# Patient Record
Sex: MALE | Race: ASIAN | Employment: OTHER | URBAN - METROPOLITAN AREA
[De-identification: names, ages, dates, MRNs, and addresses within clinical notes are randomized per-mention and may not be internally consistent; named-entity substitution may affect disease eponyms.]

---

## 2017-03-08 PROBLEM — H43.822 VITREOMACULAR ADHESION OF LEFT EYE: Status: ACTIVE | Noted: 2017-03-08

## 2017-04-03 PROBLEM — Z85.46 HISTORY OF PROSTATE CANCER: Status: ACTIVE | Noted: 2017-04-03

## 2017-04-03 PROBLEM — H35.81 CLINICALLY SIGNIFICANT MACULAR EDEMA: Status: ACTIVE | Noted: 2017-04-03

## 2017-04-03 PROBLEM — Z85.79 HISTORY OF LYMPHOMA: Status: ACTIVE | Noted: 2017-04-03

## 2017-05-11 ENCOUNTER — LAB ENCOUNTER (OUTPATIENT)
Dept: LAB | Age: 72
End: 2017-05-11
Attending: INTERNAL MEDICINE
Payer: MEDICARE

## 2017-05-11 DIAGNOSIS — E66.01 MORBID OBESITY (HCC): ICD-10-CM

## 2017-05-11 DIAGNOSIS — N18.30 CHRONIC KIDNEY DISEASE, STAGE III (MODERATE) (HCC): ICD-10-CM

## 2017-05-11 DIAGNOSIS — E55.9 AVITAMINOSIS D: ICD-10-CM

## 2017-05-11 DIAGNOSIS — E11.8 DIABETIC COMPLICATION (HCC): Primary | ICD-10-CM

## 2017-05-11 PROCEDURE — 80076 HEPATIC FUNCTION PANEL: CPT

## 2017-05-11 PROCEDURE — 82043 UR ALBUMIN QUANTITATIVE: CPT

## 2017-05-11 PROCEDURE — 83735 ASSAY OF MAGNESIUM: CPT

## 2017-05-11 PROCEDURE — 85025 COMPLETE CBC W/AUTO DIFF WBC: CPT

## 2017-05-11 PROCEDURE — 84100 ASSAY OF PHOSPHORUS: CPT

## 2017-05-11 PROCEDURE — 82570 ASSAY OF URINE CREATININE: CPT

## 2017-05-11 PROCEDURE — 36415 COLL VENOUS BLD VENIPUNCTURE: CPT

## 2017-05-11 PROCEDURE — 83970 ASSAY OF PARATHORMONE: CPT

## 2017-07-31 ENCOUNTER — LAB ENCOUNTER (OUTPATIENT)
Dept: LAB | Age: 72
End: 2017-07-31
Attending: INTERNAL MEDICINE
Payer: MEDICARE

## 2017-07-31 DIAGNOSIS — C88.0 MACROGLOBULINEMIA (HCC): Primary | ICD-10-CM

## 2017-07-31 PROCEDURE — 82784 ASSAY IGA/IGD/IGG/IGM EACH: CPT

## 2017-08-01 LAB — IMMUNOGLOBULIN A: 184 MG/DL (ref 70–312)

## 2017-09-28 PROBLEM — G57.93 NEUROPATHY OF BOTH FEET: Status: ACTIVE | Noted: 2017-09-28

## 2018-03-14 ENCOUNTER — LAB ENCOUNTER (OUTPATIENT)
Dept: LAB | Age: 73
End: 2018-03-14
Attending: INTERNAL MEDICINE
Payer: MEDICARE

## 2018-03-14 DIAGNOSIS — E11.8 DIABETIC COMPLICATION (HCC): ICD-10-CM

## 2018-03-14 DIAGNOSIS — N18.30 CHRONIC KIDNEY DISEASE, STAGE III (MODERATE) (HCC): ICD-10-CM

## 2018-03-14 DIAGNOSIS — E66.01 MORBID OBESITY (HCC): Primary | ICD-10-CM

## 2018-03-14 LAB
25-HYDROXYVITAMIN D (TOTAL): 39.3 NG/ML (ref 30–100)
ALBUMIN SERPL-MCNC: 3.8 G/DL (ref 3.5–4.8)
ALP LIVER SERPL-CCNC: 110 U/L (ref 45–117)
ALT SERPL-CCNC: 31 U/L (ref 17–63)
AST SERPL-CCNC: 19 U/L (ref 15–41)
BASOPHILS # BLD AUTO: 0.03 X10(3) UL (ref 0–0.1)
BASOPHILS NFR BLD AUTO: 0.3 %
BILIRUB DIRECT SERPL-MCNC: <0.1 MG/DL (ref 0.1–0.5)
BILIRUB SERPL-MCNC: 0.4 MG/DL (ref 0.1–2)
BILIRUB UR QL STRIP.AUTO: NEGATIVE
BUN BLD-MCNC: 43 MG/DL (ref 8–20)
CALCIUM BLD-MCNC: 9.4 MG/DL (ref 8.3–10.3)
CHLORIDE: 104 MMOL/L (ref 101–111)
CHOLEST SMN-MCNC: 132 MG/DL (ref ?–200)
CLARITY UR REFRACT.AUTO: CLEAR
CO2: 26 MMOL/L (ref 22–32)
COLOR UR AUTO: YELLOW
CREAT BLD-MCNC: 2.16 MG/DL (ref 0.7–1.3)
CREAT UR-SCNC: 69.6 MG/DL
CREAT UR-SCNC: 69.6 MG/DL
EOSINOPHIL # BLD AUTO: 0.31 X10(3) UL (ref 0–0.3)
EOSINOPHIL NFR BLD AUTO: 3.3 %
ERYTHROCYTE [DISTWIDTH] IN BLOOD BY AUTOMATED COUNT: 13.1 % (ref 11.5–16)
GLUCOSE BLD-MCNC: 217 MG/DL (ref 70–99)
GLUCOSE UR STRIP.AUTO-MCNC: NEGATIVE MG/DL
HAV IGM SER QL: 2.1 MG/DL (ref 1.7–3)
HCT VFR BLD AUTO: 38.3 % (ref 37–53)
HDLC SERPL-MCNC: 33 MG/DL (ref 45–?)
HDLC SERPL: 4 {RATIO} (ref ?–4.97)
HGB BLD-MCNC: 13 G/DL (ref 13–17)
HYALINE CASTS #/AREA URNS AUTO: PRESENT /LPF
IMMATURE GRANULOCYTE COUNT: 0.02 X10(3) UL (ref 0–1)
IMMATURE GRANULOCYTE RATIO %: 0.2 %
KETONES UR STRIP.AUTO-MCNC: NEGATIVE MG/DL
LDLC SERPL CALC-MCNC: 75 MG/DL (ref ?–130)
LEUKOCYTE ESTERASE UR QL STRIP.AUTO: NEGATIVE
LYMPHOCYTES # BLD AUTO: 3.25 X10(3) UL (ref 0.9–4)
LYMPHOCYTES NFR BLD AUTO: 34.4 %
M PROTEIN MFR SERPL ELPH: 8.7 G/DL (ref 6.1–8.3)
MCH RBC QN AUTO: 29.3 PG (ref 27–33.2)
MCHC RBC AUTO-ENTMCNC: 33.9 G/DL (ref 31–37)
MCV RBC AUTO: 86.3 FL (ref 80–99)
MICROALBUMIN UR-MCNC: 39.7 MG/DL
MICROALBUMIN/CREAT 24H UR-RTO: 570.4 UG/MG (ref ?–30)
MONOCYTES # BLD AUTO: 0.68 X10(3) UL (ref 0.1–1)
MONOCYTES NFR BLD AUTO: 7.2 %
NEUTROPHIL ABS PRELIM: 5.15 X10 (3) UL (ref 1.3–6.7)
NEUTROPHILS # BLD AUTO: 5.15 X10(3) UL (ref 1.3–6.7)
NEUTROPHILS NFR BLD AUTO: 54.6 %
NITRITE UR QL STRIP.AUTO: NEGATIVE
NONHDLC SERPL-MCNC: 99 MG/DL (ref ?–130)
PH UR STRIP.AUTO: 5 [PH] (ref 4.5–8)
PHOSPHATE SERPL-MCNC: 4.4 MG/DL (ref 2.5–4.9)
PLATELET # BLD AUTO: 463 10(3)UL (ref 150–450)
POTASSIUM SERPL-SCNC: 4.7 MMOL/L (ref 3.6–5.1)
PROT UR STRIP.AUTO-MCNC: 30 MG/DL
PROT UR-MCNC: 59.9 MG/DL
PTH-INTACT SERPL-MCNC: 81 PG/ML (ref 11.1–79.5)
RBC # BLD AUTO: 4.44 X10(6)UL (ref 3.8–5.8)
RBC UR QL AUTO: NEGATIVE
RED CELL DISTRIBUTION WIDTH-SD: 39.9 FL (ref 35.1–46.3)
SODIUM SERPL-SCNC: 137 MMOL/L (ref 136–144)
SP GR UR STRIP.AUTO: 1.01 (ref 1–1.03)
TRIGL SERPL-MCNC: 122 MG/DL (ref ?–150)
URIC ACID: 8.3 MG/DL (ref 2.4–8.7)
UROBILINOGEN UR STRIP.AUTO-MCNC: <2 MG/DL
VLDLC SERPL CALC-MCNC: 24 MG/DL (ref 5–40)
WBC # BLD AUTO: 9.4 X10(3) UL (ref 4–13)

## 2018-03-14 PROCEDURE — 84156 ASSAY OF PROTEIN URINE: CPT

## 2018-03-14 PROCEDURE — 84100 ASSAY OF PHOSPHORUS: CPT

## 2018-03-14 PROCEDURE — 82570 ASSAY OF URINE CREATININE: CPT

## 2018-03-14 PROCEDURE — 80061 LIPID PANEL: CPT

## 2018-03-14 PROCEDURE — 81001 URINALYSIS AUTO W/SCOPE: CPT

## 2018-03-14 PROCEDURE — 36415 COLL VENOUS BLD VENIPUNCTURE: CPT

## 2018-03-14 PROCEDURE — 84550 ASSAY OF BLOOD/URIC ACID: CPT

## 2018-03-14 PROCEDURE — 85025 COMPLETE CBC W/AUTO DIFF WBC: CPT

## 2018-03-14 PROCEDURE — 82306 VITAMIN D 25 HYDROXY: CPT

## 2018-03-14 PROCEDURE — 83970 ASSAY OF PARATHORMONE: CPT

## 2018-03-14 PROCEDURE — 82043 UR ALBUMIN QUANTITATIVE: CPT

## 2018-03-14 PROCEDURE — 80048 BASIC METABOLIC PNL TOTAL CA: CPT

## 2018-03-14 PROCEDURE — 83735 ASSAY OF MAGNESIUM: CPT

## 2018-03-14 PROCEDURE — 80076 HEPATIC FUNCTION PANEL: CPT

## 2018-08-20 PROBLEM — E11.22 CKD STAGE 4 DUE TO TYPE 2 DIABETES MELLITUS (HCC): Status: ACTIVE | Noted: 2018-08-20

## 2018-08-20 PROBLEM — N18.4 CKD STAGE 4 DUE TO TYPE 2 DIABETES MELLITUS (HCC): Status: ACTIVE | Noted: 2018-08-20

## 2018-08-20 PROCEDURE — 81001 URINALYSIS AUTO W/SCOPE: CPT | Performed by: INTERNAL MEDICINE

## 2018-09-13 ENCOUNTER — LAB ENCOUNTER (OUTPATIENT)
Dept: LAB | Age: 73
End: 2018-09-13
Attending: INTERNAL MEDICINE
Payer: MEDICARE

## 2018-09-13 DIAGNOSIS — E11.8 DIABETIC COMPLICATION (HCC): Primary | ICD-10-CM

## 2018-09-13 DIAGNOSIS — E55.9 AVITAMINOSIS D: ICD-10-CM

## 2018-09-13 DIAGNOSIS — N18.30 CHRONIC KIDNEY DISEASE, STAGE III (MODERATE) (HCC): ICD-10-CM

## 2018-09-13 DIAGNOSIS — I10 ESSENTIAL HYPERTENSION, MALIGNANT: ICD-10-CM

## 2018-09-13 DIAGNOSIS — R73.09 ELEVATED HEMOGLOBIN A1C: ICD-10-CM

## 2018-09-13 LAB
ALBUMIN SERPL-MCNC: 3.5 G/DL (ref 3.5–4.8)
ALP LIVER SERPL-CCNC: 112 U/L (ref 45–117)
ALT SERPL-CCNC: 21 U/L (ref 17–63)
ANION GAP SERPL CALC-SCNC: 8 MMOL/L (ref 0–18)
AST SERPL-CCNC: 18 U/L (ref 15–41)
BASOPHILS # BLD AUTO: 0.03 X10(3) UL (ref 0–0.1)
BASOPHILS NFR BLD AUTO: 0.3 %
BILIRUB DIRECT SERPL-MCNC: <0.1 MG/DL (ref 0.1–0.5)
BILIRUB SERPL-MCNC: 0.3 MG/DL (ref 0.1–2)
BILIRUB UR QL STRIP.AUTO: NEGATIVE
BUN BLD-MCNC: 50 MG/DL (ref 8–20)
BUN/CREAT SERPL: 22.7 (ref 10–20)
CALCIUM BLD-MCNC: 8.6 MG/DL (ref 8.3–10.3)
CHLORIDE SERPL-SCNC: 99 MMOL/L (ref 101–111)
CHOLEST SMN-MCNC: 113 MG/DL (ref ?–200)
CLARITY UR REFRACT.AUTO: CLEAR
CO2 SERPL-SCNC: 25 MMOL/L (ref 22–32)
COLOR UR AUTO: YELLOW
CREAT BLD-MCNC: 2.2 MG/DL (ref 0.7–1.3)
CREAT UR-SCNC: 91 MG/DL
CREAT UR-SCNC: 92.4 MG/DL
EOSINOPHIL # BLD AUTO: 0.27 X10(3) UL (ref 0–0.3)
EOSINOPHIL NFR BLD AUTO: 2.7 %
ERYTHROCYTE [DISTWIDTH] IN BLOOD BY AUTOMATED COUNT: 13.2 % (ref 11.5–16)
EST. AVERAGE GLUCOSE BLD GHB EST-MCNC: 240 MG/DL (ref 68–126)
GLUCOSE BLD-MCNC: 300 MG/DL (ref 70–99)
GLUCOSE UR STRIP.AUTO-MCNC: NEGATIVE MG/DL
HAV IGM SER QL: 2 MG/DL (ref 1.8–2.5)
HBA1C MFR BLD HPLC: 10 % (ref ?–5.7)
HCT VFR BLD AUTO: 38 % (ref 37–53)
HDLC SERPL-MCNC: 25 MG/DL (ref 40–59)
HGB BLD-MCNC: 12.2 G/DL (ref 13–17)
HYALINE CASTS #/AREA URNS AUTO: PRESENT /LPF
IMMATURE GRANULOCYTE COUNT: 0.03 X10(3) UL (ref 0–1)
IMMATURE GRANULOCYTE RATIO %: 0.3 %
KETONES UR STRIP.AUTO-MCNC: NEGATIVE MG/DL
LDLC SERPL CALC-MCNC: 49 MG/DL (ref ?–100)
LEUKOCYTE ESTERASE UR QL STRIP.AUTO: NEGATIVE
LYMPHOCYTES # BLD AUTO: 4.17 X10(3) UL (ref 0.9–4)
LYMPHOCYTES NFR BLD AUTO: 41.2 %
M PROTEIN MFR SERPL ELPH: 8.6 G/DL (ref 6.1–8.3)
MCH RBC QN AUTO: 28.4 PG (ref 27–33.2)
MCHC RBC AUTO-ENTMCNC: 32.1 G/DL (ref 31–37)
MCV RBC AUTO: 88.6 FL (ref 80–99)
MICROALBUMIN UR-MCNC: 32.4 MG/DL
MICROALBUMIN/CREAT 24H UR-RTO: 356 UG/MG (ref ?–30)
MONOCYTES # BLD AUTO: 0.47 X10(3) UL (ref 0.1–1)
MONOCYTES NFR BLD AUTO: 4.6 %
NEUTROPHIL ABS PRELIM: 5.15 X10 (3) UL (ref 1.3–6.7)
NEUTROPHILS # BLD AUTO: 5.15 X10(3) UL (ref 1.3–6.7)
NEUTROPHILS NFR BLD AUTO: 50.9 %
NITRITE UR QL STRIP.AUTO: NEGATIVE
NONHDLC SERPL-MCNC: 88 MG/DL (ref ?–130)
OSMOLALITY SERPL CALC.SUM OF ELEC: 299 MOSM/KG (ref 275–295)
PH UR STRIP.AUTO: 5 [PH] (ref 4.5–8)
PHOSPHATE SERPL-MCNC: 3.6 MG/DL (ref 2.5–4.9)
PLATELET # BLD AUTO: 501 10(3)UL (ref 150–450)
POTASSIUM SERPL-SCNC: 4.5 MMOL/L (ref 3.6–5.1)
PROT UR STRIP.AUTO-MCNC: 30 MG/DL
PROT UR-MCNC: 48.1 MG/DL
RBC # BLD AUTO: 4.29 X10(6)UL (ref 3.8–5.8)
RBC UR QL AUTO: NEGATIVE
RED CELL DISTRIBUTION WIDTH-SD: 42.3 FL (ref 35.1–46.3)
SODIUM SERPL-SCNC: 132 MMOL/L (ref 136–144)
SP GR UR STRIP.AUTO: 1.01 (ref 1–1.03)
TRIGL SERPL-MCNC: 193 MG/DL (ref 30–149)
URATE SERPL-MCNC: 9 MG/DL (ref 2.4–8.7)
UROBILINOGEN UR STRIP.AUTO-MCNC: <2 MG/DL
VLDLC SERPL CALC-MCNC: 39 MG/DL (ref 0–30)
WBC # BLD AUTO: 10.1 X10(3) UL (ref 4–13)

## 2018-09-13 PROCEDURE — 83735 ASSAY OF MAGNESIUM: CPT

## 2018-09-13 PROCEDURE — 80076 HEPATIC FUNCTION PANEL: CPT

## 2018-09-13 PROCEDURE — 36415 COLL VENOUS BLD VENIPUNCTURE: CPT

## 2018-09-13 PROCEDURE — 84156 ASSAY OF PROTEIN URINE: CPT

## 2018-09-13 PROCEDURE — 82043 UR ALBUMIN QUANTITATIVE: CPT

## 2018-09-13 PROCEDURE — 85025 COMPLETE CBC W/AUTO DIFF WBC: CPT

## 2018-09-13 PROCEDURE — 83970 ASSAY OF PARATHORMONE: CPT

## 2018-09-13 PROCEDURE — 81001 URINALYSIS AUTO W/SCOPE: CPT

## 2018-09-13 PROCEDURE — 80048 BASIC METABOLIC PNL TOTAL CA: CPT

## 2018-09-13 PROCEDURE — 80061 LIPID PANEL: CPT

## 2018-09-13 PROCEDURE — 84100 ASSAY OF PHOSPHORUS: CPT

## 2018-09-13 PROCEDURE — 82570 ASSAY OF URINE CREATININE: CPT

## 2018-09-13 PROCEDURE — 83036 HEMOGLOBIN GLYCOSYLATED A1C: CPT

## 2018-09-13 PROCEDURE — 84550 ASSAY OF BLOOD/URIC ACID: CPT

## 2018-09-13 PROCEDURE — 82306 VITAMIN D 25 HYDROXY: CPT

## 2018-09-14 LAB
PTH-INTACT SERPL-MCNC: 137.2 PG/ML (ref 11.1–79.5)
VIT D+METAB SERPL-MCNC: 34.6 NG/ML (ref 30–100)

## 2018-09-18 NOTE — PROGRESS NOTES
S/w pt, advised of results and comments. Pt verbalized understanding and agreed. Labs in 3 mo ordered. Results with comments sent via [facility]. Routing also to MA pool to print and fax to Dr Lopez/nephrology  Fax # per pt is #[phone]  Thanks!

## 2018-09-19 PROBLEM — Z79.4 TYPE 2 DIABETES MELLITUS WITH BOTH EYES AFFECTED BY MODERATE NONPROLIFERATIVE RETINOPATHY AND MACULAR EDEMA, WITH LONG-TERM CURRENT USE OF INSULIN (HCC): Status: ACTIVE | Noted: 2018-09-19

## 2018-09-19 PROBLEM — E11.3313 TYPE 2 DIABETES MELLITUS WITH BOTH EYES AFFECTED BY MODERATE NONPROLIFERATIVE RETINOPATHY AND MACULAR EDEMA, WITH LONG-TERM CURRENT USE OF INSULIN (HCC): Status: ACTIVE | Noted: 2018-09-19

## 2018-09-26 PROBLEM — Z79.4 UNCONTROLLED TYPE 2 DIABETES MELLITUS WITH BOTH EYES AFFECTED BY MODERATE NONPROLIFERATIVE RETINOPATHY AND MACULAR EDEMA, WITH LONG-TERM CURRENT USE OF INSULIN (HCC): Status: ACTIVE | Noted: 2018-09-26

## 2018-09-26 PROBLEM — Z79.4 UNCONTROLLED TYPE 2 DIABETES MELLITUS WITH OTHER CIRCULATORY COMPLICATION, WITH LONG-TERM CURRENT USE OF INSULIN: Status: ACTIVE | Noted: 2018-09-26

## 2018-09-26 PROBLEM — E11.65 UNCONTROLLED TYPE 2 DIABETES MELLITUS WITH BOTH EYES AFFECTED BY MODERATE NONPROLIFERATIVE RETINOPATHY AND MACULAR EDEMA, WITH LONG-TERM CURRENT USE OF INSULIN (HCC): Status: ACTIVE | Noted: 2018-09-26

## 2018-09-26 PROBLEM — E11.29 MICROALBUMINURIA DUE TO TYPE 2 DIABETES MELLITUS (HCC): Status: ACTIVE | Noted: 2018-09-26

## 2018-09-26 PROBLEM — Z79.4 LONG-TERM INSULIN USE (HCC): Status: ACTIVE | Noted: 2018-09-26

## 2018-09-26 PROBLEM — IMO0002 UNCONTROLLED TYPE 2 DIABETES MELLITUS WITH STAGE 4 CHRONIC KIDNEY DISEASE, WITH LONG-TERM CURRENT USE OF INSULIN: Status: ACTIVE | Noted: 2018-09-26

## 2018-09-26 PROBLEM — Z79.4 UNCONTROLLED TYPE 2 DIABETES MELLITUS WITH STAGE 4 CHRONIC KIDNEY DISEASE, WITH LONG-TERM CURRENT USE OF INSULIN (HCC): Status: ACTIVE | Noted: 2018-09-26

## 2018-09-26 PROBLEM — E11.65 UNCONTROLLED TYPE 2 DIABETES MELLITUS WITH HYPERGLYCEMIA, WITH LONG-TERM CURRENT USE OF INSULIN (HCC): Status: ACTIVE | Noted: 2018-09-26

## 2018-09-26 PROBLEM — E11.22 UNCONTROLLED TYPE 2 DIABETES MELLITUS WITH STAGE 4 CHRONIC KIDNEY DISEASE, WITH LONG-TERM CURRENT USE OF INSULIN (HCC): Status: ACTIVE | Noted: 2018-09-26

## 2018-09-26 PROBLEM — IMO0002 UNCONTROLLED TYPE 2 DIABETES MELLITUS WITH BOTH EYES AFFECTED BY MODERATE NONPROLIFERATIVE RETINOPATHY AND MACULAR EDEMA, WITH LONG-TERM CURRENT USE OF INSULIN: Status: ACTIVE | Noted: 2018-09-26

## 2018-09-26 PROBLEM — N18.4 UNCONTROLLED TYPE 2 DIABETES MELLITUS WITH STAGE 4 CHRONIC KIDNEY DISEASE, WITH LONG-TERM CURRENT USE OF INSULIN (HCC): Status: ACTIVE | Noted: 2018-09-26

## 2018-09-26 PROBLEM — E11.65 UNCONTROLLED TYPE 2 DIABETES MELLITUS WITH STAGE 4 CHRONIC KIDNEY DISEASE, WITH LONG-TERM CURRENT USE OF INSULIN (HCC): Status: ACTIVE | Noted: 2018-09-26

## 2018-09-26 PROBLEM — E11.3313 TYPE 2 DIABETES MELLITUS WITH BOTH EYES AFFECTED BY MODERATE NONPROLIFERATIVE RETINOPATHY AND MACULAR EDEMA, WITH LONG-TERM CURRENT USE OF INSULIN (HCC): Status: RESOLVED | Noted: 2018-09-19 | Resolved: 2018-09-26

## 2018-09-26 PROBLEM — IMO0002 UNCONTROLLED TYPE 2 DIABETES MELLITUS WITH OTHER CIRCULATORY COMPLICATION, WITH LONG-TERM CURRENT USE OF INSULIN: Status: ACTIVE | Noted: 2018-09-26

## 2018-09-26 PROBLEM — Z79.4 TYPE 2 DIABETES MELLITUS WITH BOTH EYES AFFECTED BY MODERATE NONPROLIFERATIVE RETINOPATHY AND MACULAR EDEMA, WITH LONG-TERM CURRENT USE OF INSULIN (HCC): Status: RESOLVED | Noted: 2018-09-19 | Resolved: 2018-09-26

## 2018-09-26 PROBLEM — R80.9 MICROALBUMINURIA DUE TO TYPE 2 DIABETES MELLITUS (HCC): Status: ACTIVE | Noted: 2018-09-26

## 2018-09-26 PROBLEM — E11.59 UNCONTROLLED TYPE 2 DIABETES MELLITUS WITH OTHER CIRCULATORY COMPLICATION, WITH LONG-TERM CURRENT USE OF INSULIN: Status: ACTIVE | Noted: 2018-09-26

## 2018-09-26 PROBLEM — E11.65 TYPE 2 DIABETES, UNCONTROLLED, WITH NEUROPATHY (HCC): Status: ACTIVE | Noted: 2018-09-26

## 2018-09-26 PROBLEM — E11.65 UNCONTROLLED TYPE 2 DIABETES MELLITUS WITH OTHER CIRCULATORY COMPLICATION, WITH LONG-TERM CURRENT USE OF INSULIN: Status: ACTIVE | Noted: 2018-09-26

## 2018-09-26 PROBLEM — E11.3313 UNCONTROLLED TYPE 2 DIABETES MELLITUS WITH BOTH EYES AFFECTED BY MODERATE NONPROLIFERATIVE RETINOPATHY AND MACULAR EDEMA, WITH LONG-TERM CURRENT USE OF INSULIN (HCC): Status: ACTIVE | Noted: 2018-09-26

## 2018-09-26 PROBLEM — Z79.4 UNCONTROLLED TYPE 2 DIABETES MELLITUS WITH HYPERGLYCEMIA, WITH LONG-TERM CURRENT USE OF INSULIN (HCC): Status: ACTIVE | Noted: 2018-09-26

## 2018-09-26 PROBLEM — IMO0002 TYPE 2 DIABETES, UNCONTROLLED, WITH NEUROPATHY: Status: ACTIVE | Noted: 2018-09-26

## 2018-09-26 PROBLEM — E11.40 TYPE 2 DIABETES, UNCONTROLLED, WITH NEUROPATHY (HCC): Status: ACTIVE | Noted: 2018-09-26

## 2018-10-16 PROBLEM — I35.0 NONRHEUMATIC AORTIC VALVE STENOSIS: Status: ACTIVE | Noted: 2018-10-16

## 2018-11-28 ENCOUNTER — APPOINTMENT (OUTPATIENT)
Dept: LAB | Age: 73
End: 2018-11-28
Attending: INTERNAL MEDICINE
Payer: MEDICARE

## 2018-11-28 DIAGNOSIS — N18.30 CHRONIC KIDNEY DISEASE, STAGE III (MODERATE) (HCC): ICD-10-CM

## 2018-11-28 DIAGNOSIS — E55.9 AVITAMINOSIS D: ICD-10-CM

## 2018-11-28 DIAGNOSIS — R73.09 ELEVATED HEMOGLOBIN A1C: ICD-10-CM

## 2018-11-28 DIAGNOSIS — E11.8 DIABETIC COMPLICATION (HCC): ICD-10-CM

## 2018-11-28 DIAGNOSIS — I10 ESSENTIAL HYPERTENSION, MALIGNANT: ICD-10-CM

## 2018-11-28 PROCEDURE — 36415 COLL VENOUS BLD VENIPUNCTURE: CPT

## 2018-11-28 PROCEDURE — 82306 VITAMIN D 25 HYDROXY: CPT

## 2018-11-28 PROCEDURE — 83970 ASSAY OF PARATHORMONE: CPT

## 2019-01-09 PROCEDURE — 81001 URINALYSIS AUTO W/SCOPE: CPT | Performed by: INTERNAL MEDICINE

## 2019-01-15 ENCOUNTER — LAB ENCOUNTER (OUTPATIENT)
Dept: LAB | Age: 74
End: 2019-01-15
Attending: INTERNAL MEDICINE
Payer: MEDICARE

## 2019-01-15 DIAGNOSIS — N18.30 CHRONIC KIDNEY DISEASE, STAGE III (MODERATE) (HCC): Primary | ICD-10-CM

## 2019-01-15 LAB
ANION GAP SERPL CALC-SCNC: 11 MMOL/L (ref 0–18)
BUN BLD-MCNC: 58 MG/DL (ref 8–20)
BUN/CREAT SERPL: 27.2 (ref 10–20)
CALCIUM BLD-MCNC: 9.3 MG/DL (ref 8.3–10.3)
CHLORIDE SERPL-SCNC: 97 MMOL/L (ref 101–111)
CO2 SERPL-SCNC: 25 MMOL/L (ref 22–32)
CREAT BLD-MCNC: 2.13 MG/DL (ref 0.7–1.3)
GLUCOSE BLD-MCNC: 155 MG/DL (ref 70–99)
OSMOLALITY SERPL CALC.SUM OF ELEC: 295 MOSM/KG (ref 275–295)
POTASSIUM SERPL-SCNC: 3.9 MMOL/L (ref 3.6–5.1)
SODIUM SERPL-SCNC: 133 MMOL/L (ref 136–144)

## 2019-01-15 PROCEDURE — 36415 COLL VENOUS BLD VENIPUNCTURE: CPT

## 2019-01-15 PROCEDURE — 80048 BASIC METABOLIC PNL TOTAL CA: CPT

## 2019-02-15 ENCOUNTER — OFFICE VISIT (OUTPATIENT)
Dept: RHEUMATOLOGY | Facility: CLINIC | Age: 74
End: 2019-02-15
Payer: MEDICARE

## 2019-02-15 ENCOUNTER — APPOINTMENT (OUTPATIENT)
Dept: LAB | Age: 74
End: 2019-02-15
Attending: INTERNAL MEDICINE
Payer: MEDICARE

## 2019-02-15 VITALS
DIASTOLIC BLOOD PRESSURE: 62 MMHG | RESPIRATION RATE: 16 BRPM | WEIGHT: 165 LBS | SYSTOLIC BLOOD PRESSURE: 122 MMHG | HEART RATE: 68 BPM | BODY MASS INDEX: 24 KG/M2 | TEMPERATURE: 99 F

## 2019-02-15 DIAGNOSIS — E79.0 HYPERURICEMIA: ICD-10-CM

## 2019-02-15 DIAGNOSIS — M79.10 MYALGIA: ICD-10-CM

## 2019-02-15 DIAGNOSIS — Z85.72 HISTORY OF NON-HODGKIN'S LYMPHOMA: ICD-10-CM

## 2019-02-15 DIAGNOSIS — R74.8 ELEVATED CK: Primary | ICD-10-CM

## 2019-02-15 DIAGNOSIS — M79.674 PAIN OF TOE OF RIGHT FOOT: ICD-10-CM

## 2019-02-15 DIAGNOSIS — R70.0 ELEVATED SED RATE: ICD-10-CM

## 2019-02-15 DIAGNOSIS — M62.81 MUSCLE WEAKNESS: ICD-10-CM

## 2019-02-15 DIAGNOSIS — R74.8 ELEVATED CK: ICD-10-CM

## 2019-02-15 LAB
BILIRUB UR QL STRIP.AUTO: NEGATIVE
CK SERPL-CCNC: 73 U/L (ref 39–308)
CLARITY UR REFRACT.AUTO: CLEAR
COLOR UR AUTO: YELLOW
CRP SERPL-MCNC: 0.73 MG/DL (ref ?–0.3)
GLUCOSE UR STRIP.AUTO-MCNC: NEGATIVE MG/DL
IGA SERPL-MCNC: 195 MG/DL (ref 70–312)
IGM SERPL-MCNC: 2090 MG/DL (ref 43–279)
IMMUNOGLOBULIN PNL SER-MCNC: 930 MG/DL (ref 791–1643)
KETONES UR STRIP.AUTO-MCNC: NEGATIVE MG/DL
LDH SERPL L TO P-CCNC: 249 U/L (ref 87–241)
LEUKOCYTE ESTERASE UR QL STRIP.AUTO: NEGATIVE
NITRITE UR QL STRIP.AUTO: NEGATIVE
PH UR STRIP.AUTO: 5 [PH] (ref 4.5–8)
PROT UR STRIP.AUTO-MCNC: NEGATIVE MG/DL
RBC UR QL AUTO: NEGATIVE
RHEUMATOID FACT SERPL-ACNC: 293 IU/ML (ref ?–15)
SED RATE-ML: 116 MM/HR (ref 0–12)
SP GR UR STRIP.AUTO: 1.01 (ref 1–1.03)
URATE SERPL-MCNC: 12.5 MG/DL (ref 3.5–7.2)
UROBILINOGEN UR STRIP.AUTO-MCNC: <2 MG/DL

## 2019-02-15 PROCEDURE — 86255 FLUORESCENT ANTIBODY SCREEN: CPT

## 2019-02-15 PROCEDURE — 83516 IMMUNOASSAY NONANTIBODY: CPT

## 2019-02-15 PROCEDURE — 82550 ASSAY OF CK (CPK): CPT

## 2019-02-15 PROCEDURE — 84550 ASSAY OF BLOOD/URIC ACID: CPT

## 2019-02-15 PROCEDURE — 86235 NUCLEAR ANTIGEN ANTIBODY: CPT

## 2019-02-15 PROCEDURE — 83615 LACTATE (LD) (LDH) ENZYME: CPT

## 2019-02-15 PROCEDURE — 96372 THER/PROPH/DIAG INJ SC/IM: CPT | Performed by: INTERNAL MEDICINE

## 2019-02-15 PROCEDURE — 82085 ASSAY OF ALDOLASE: CPT

## 2019-02-15 PROCEDURE — 85652 RBC SED RATE AUTOMATED: CPT | Performed by: INTERNAL MEDICINE

## 2019-02-15 PROCEDURE — 83876 ASSAY MYELOPEROXIDASE: CPT

## 2019-02-15 PROCEDURE — 81003 URINALYSIS AUTO W/O SCOPE: CPT | Performed by: INTERNAL MEDICINE

## 2019-02-15 PROCEDURE — 82784 ASSAY IGA/IGD/IGG/IGM EACH: CPT

## 2019-02-15 PROCEDURE — 86140 C-REACTIVE PROTEIN: CPT | Performed by: INTERNAL MEDICINE

## 2019-02-15 PROCEDURE — 86200 CCP ANTIBODY: CPT

## 2019-02-15 PROCEDURE — 86431 RHEUMATOID FACTOR QUANT: CPT | Performed by: INTERNAL MEDICINE

## 2019-02-15 PROCEDURE — 99205 OFFICE O/P NEW HI 60 MIN: CPT | Performed by: INTERNAL MEDICINE

## 2019-02-15 PROCEDURE — 86038 ANTINUCLEAR ANTIBODIES: CPT

## 2019-02-15 RX ORDER — PREDNISONE 10 MG/1
50 TABLET ORAL DAILY
Qty: 90 TABLET | Refills: 2 | Status: SHIPPED | OUTPATIENT
Start: 2019-02-15 | End: 2019-02-25

## 2019-02-15 RX ORDER — METHYLPREDNISOLONE ACETATE 40 MG/ML
40 INJECTION, SUSPENSION INTRA-ARTICULAR; INTRALESIONAL; INTRAMUSCULAR; SOFT TISSUE ONCE
Status: COMPLETED | OUTPATIENT
Start: 2019-02-15 | End: 2019-02-15

## 2019-02-15 RX ADMIN — METHYLPREDNISOLONE ACETATE 40 MG: 40 INJECTION, SUSPENSION INTRA-ARTICULAR; INTRALESIONAL; INTRAMUSCULAR; SOFT TISSUE at 13:00:00

## 2019-02-17 LAB
ALDOLASE, SERUM: 5.8 U/L
CYCLIC CITRULLINATED PEPTIDE: 3 UNITS

## 2019-02-18 LAB — ANA SCREEN: NEGATIVE

## 2019-02-19 LAB
MYELOPEROX ANTIBODIES, IGG: 0 AU/ML
SERINE PROTEASE3, IGG: 1 AU/ML

## 2019-02-21 DIAGNOSIS — R70.0 ELEVATED SED RATE: Primary | ICD-10-CM

## 2019-02-21 DIAGNOSIS — Z85.79 HISTORY OF LYMPHOMA: ICD-10-CM

## 2019-02-21 DIAGNOSIS — R79.82 ELEVATED C-REACTIVE PROTEIN (CRP): ICD-10-CM

## 2019-02-22 ENCOUNTER — APPOINTMENT (OUTPATIENT)
Dept: LAB | Age: 74
End: 2019-02-22
Attending: INTERNAL MEDICINE
Payer: MEDICARE

## 2019-02-22 LAB
CRP SERPL-MCNC: <0.29 MG/DL (ref ?–0.3)
SED RATE-ML: 140 MM/HR (ref 0–12)

## 2019-02-22 PROCEDURE — 86140 C-REACTIVE PROTEIN: CPT | Performed by: INTERNAL MEDICINE

## 2019-02-22 PROCEDURE — 85652 RBC SED RATE AUTOMATED: CPT | Performed by: INTERNAL MEDICINE

## 2019-02-22 PROCEDURE — 36415 COLL VENOUS BLD VENIPUNCTURE: CPT | Performed by: INTERNAL MEDICINE

## 2019-02-25 ENCOUNTER — TELEPHONE (OUTPATIENT)
Dept: RHEUMATOLOGY | Facility: CLINIC | Age: 74
End: 2019-02-25

## 2019-02-25 DIAGNOSIS — R70.0 ELEVATED SED RATE: Primary | ICD-10-CM

## 2019-02-25 DIAGNOSIS — M1A.3710 CHRONIC GOUT DUE TO RENAL IMPAIRMENT INVOLVING TOE OF RIGHT FOOT WITHOUT TOPHUS: ICD-10-CM

## 2019-02-25 DIAGNOSIS — Z85.79 HISTORY OF LYMPHOMA: ICD-10-CM

## 2019-02-25 RX ORDER — ALLOPURINOL 100 MG/1
100 TABLET ORAL DAILY
Qty: 30 TABLET | Refills: 1 | Status: SHIPPED | OUTPATIENT
Start: 2019-02-25 | End: 2019-05-19

## 2019-02-25 RX ORDER — PREDNISONE 1 MG/1
TABLET ORAL
Qty: 90 TABLET | Refills: 0 | Status: SHIPPED | OUTPATIENT
Start: 2019-02-25 | End: 2019-05-10

## 2019-02-25 NOTE — TELEPHONE ENCOUNTER
Called and spoke with son, son aware of what blood tests need to be done, verbalized understanding.  mp

## 2019-02-25 NOTE — TELEPHONE ENCOUNTER
Spoke to patient's son per patient's request earlier this morning regarding patient's blood tests. Discussed slightly increased IgM levels compared to last set that was drawn in October.   Discussed that patient still has persistent elevations of his ESR g in the sed rate can happen with patients with chronic elevations of the IgM, recommended treating based off of patient's symptoms and not based off of lab test.  Will have patient follow-up within the next 1-2 weeks rather than his appointment in April.   Christine Soto

## 2019-02-27 LAB
EJ (GLYCYL - TRNA SYNTHETASE) ANTIBODY: NEGATIVE
FIBRILLARIN (U3 RNP) AB, IGG: NEGATIVE
JO-1 (HISTIDY1-TRNA SYNTHETASE) AB, IGG: 0 AU/ML
KU ANTIBODY: NEGATIVE
MDA5 (CADM-140) ANTIBODY: NEGATIVE
MI-2 (NUCLEAR HELICASE PROTEIN) ANTIBODY: NEGATIVE
NXP-2 (NUCLEAR MATRIX PROTEIN-2) AB: NEGATIVE
OJ (ISOLEUCYL-TRNA SYNTHETASE) ANTIBODY: NEGATIVE
P155/140 (TIF-1 GAMMA) ANTIBODY: NEGATIVE
PL-12 (ALANYL-TRNA SYNTHETASE) ANTIBODY: NEGATIVE
PL-7 (THREONYL-TRNA SYNTHETASE) ANTIBODY: NEGATIVE
PM_SCL 100 ANTIBODY, IGG: NEGATIVE
RIBONUCLEIC PROTEIN (U1) (ENA) AB, IGG: 0 AU/ML
SAE1 (SUMO ACTIVATING ENZYME) ANTIBODY: NEGATIVE
SRP (SINGLE RECOGNITION PARTICLE) AB: NEGATIVE
SSA 52 (RO) (ENA) ANTIBODY, IGG: 4 AU/ML
SSA 60 (RO) (ENA) ANTIBODY, IGG: 0 AU/ML
U2 SN (SMALL NUCLEAR) RNP ANTIBODY: NEGATIVE

## 2019-03-09 ENCOUNTER — HOSPITAL ENCOUNTER (OUTPATIENT)
Facility: HOSPITAL | Age: 74
Setting detail: OBSERVATION
Discharge: HOME | End: 2019-03-11
Attending: EMERGENCY MEDICINE | Admitting: HOSPITALIST
Payer: MEDICARE

## 2019-03-09 ENCOUNTER — APPOINTMENT (OUTPATIENT)
Dept: GENERAL RADIOLOGY | Facility: HOSPITAL | Age: 74
End: 2019-03-09
Attending: EMERGENCY MEDICINE
Payer: MEDICARE

## 2019-03-09 DIAGNOSIS — E78.5 HYPERLIPIDEMIA, UNSPECIFIED HYPERLIPIDEMIA TYPE: ICD-10-CM

## 2019-03-09 DIAGNOSIS — N18.9 CHRONIC KIDNEY DISEASE, UNSPECIFIED CKD STAGE: ICD-10-CM

## 2019-03-09 DIAGNOSIS — Z79.4 TYPE 2 DIABETES MELLITUS WITH HYPERGLYCEMIA, WITH LONG-TERM CURRENT USE OF INSULIN (HCC): ICD-10-CM

## 2019-03-09 DIAGNOSIS — I25.10 CORONARY ARTERY DISEASE INVOLVING NATIVE CORONARY ARTERY OF NATIVE HEART, ANGINA PRESENCE UNSPECIFIED: ICD-10-CM

## 2019-03-09 DIAGNOSIS — I10 BENIGN ESSENTIAL HTN: ICD-10-CM

## 2019-03-09 DIAGNOSIS — R07.9 ACUTE CHEST PAIN: Primary | ICD-10-CM

## 2019-03-09 DIAGNOSIS — E11.65 TYPE 2 DIABETES MELLITUS WITH HYPERGLYCEMIA, WITH LONG-TERM CURRENT USE OF INSULIN (HCC): ICD-10-CM

## 2019-03-09 LAB
ALBUMIN SERPL-MCNC: 3.2 G/DL (ref 3.4–5)
ALBUMIN/GLOB SERPL: 0.7 {RATIO} (ref 1–2)
ALP LIVER SERPL-CCNC: 80 U/L (ref 45–117)
ALT SERPL-CCNC: 40 U/L (ref 16–61)
ANION GAP SERPL CALC-SCNC: 11 MMOL/L (ref 0–18)
APTT PPP: 25.5 SECONDS (ref 26.1–34.6)
AST SERPL-CCNC: 21 U/L (ref 15–37)
BASOPHILS # BLD AUTO: 0.02 X10(3) UL (ref 0–0.2)
BASOPHILS NFR BLD AUTO: 0.1 %
BILIRUB SERPL-MCNC: 0.4 MG/DL (ref 0.1–2)
BUN BLD-MCNC: 54 MG/DL (ref 7–18)
BUN/CREAT SERPL: 24.1 (ref 10–20)
CALCIUM BLD-MCNC: 8.8 MG/DL (ref 8.5–10.1)
CHLORIDE SERPL-SCNC: 101 MMOL/L (ref 98–107)
CO2 SERPL-SCNC: 23 MMOL/L (ref 21–32)
CREAT BLD-MCNC: 2.24 MG/DL (ref 0.7–1.3)
DEPRECATED RDW RBC AUTO: 41.9 FL (ref 35.1–46.3)
EOSINOPHIL # BLD AUTO: 0 X10(3) UL (ref 0–0.7)
EOSINOPHIL NFR BLD AUTO: 0 %
ERYTHROCYTE [DISTWIDTH] IN BLOOD BY AUTOMATED COUNT: 13.4 % (ref 11–15)
GLOBULIN PLAS-MCNC: 4.9 G/DL (ref 2.8–4.4)
GLUCOSE BLD-MCNC: 312 MG/DL (ref 70–99)
GLUCOSE BLD-MCNC: 370 MG/DL (ref 70–99)
HCT VFR BLD AUTO: 37.9 % (ref 39–53)
HGB BLD-MCNC: 12.6 G/DL (ref 13–17.5)
IMM GRANULOCYTES # BLD AUTO: 0.13 X10(3) UL (ref 0–1)
IMM GRANULOCYTES NFR BLD: 0.9 %
INR BLD: 0.87 (ref 0.9–1.1)
LYMPHOCYTES # BLD AUTO: 1.28 X10(3) UL (ref 1–4)
LYMPHOCYTES NFR BLD AUTO: 9.1 %
M PROTEIN MFR SERPL ELPH: 8.1 G/DL (ref 6.4–8.2)
MCH RBC QN AUTO: 28.4 PG (ref 26–34)
MCHC RBC AUTO-ENTMCNC: 33.2 G/DL (ref 31–37)
MCV RBC AUTO: 85.6 FL (ref 80–100)
MONOCYTES # BLD AUTO: 0.18 X10(3) UL (ref 0.1–1)
MONOCYTES NFR BLD AUTO: 1.3 %
NEUTROPHILS # BLD AUTO: 12.42 X10 (3) UL (ref 1.5–7.7)
NEUTROPHILS # BLD AUTO: 12.42 X10(3) UL (ref 1.5–7.7)
NEUTROPHILS NFR BLD AUTO: 88.6 %
OSMOLALITY SERPL CALC.SUM OF ELEC: 310 MOSM/KG (ref 275–295)
PLATELET # BLD AUTO: 317 10(3)UL (ref 150–450)
POTASSIUM SERPL-SCNC: 4.7 MMOL/L (ref 3.5–5.1)
PSA SERPL DL<=0.01 NG/ML-MCNC: 12.2 SECONDS (ref 12.5–14.7)
RBC # BLD AUTO: 4.43 X10(6)UL (ref 3.8–5.8)
SODIUM SERPL-SCNC: 135 MMOL/L (ref 136–145)
TROPONIN I SERPL-MCNC: <0.045 NG/ML (ref ?–0.04)
WBC # BLD AUTO: 14 X10(3) UL (ref 4–11)

## 2019-03-09 PROCEDURE — 99285 EMERGENCY DEPT VISIT HI MDM: CPT | Performed by: EMERGENCY MEDICINE

## 2019-03-09 PROCEDURE — 93010 ELECTROCARDIOGRAM REPORT: CPT | Performed by: EMERGENCY MEDICINE

## 2019-03-09 PROCEDURE — 71045 X-RAY EXAM CHEST 1 VIEW: CPT | Performed by: EMERGENCY MEDICINE

## 2019-03-09 PROCEDURE — 93005 ELECTROCARDIOGRAM TRACING: CPT

## 2019-03-09 PROCEDURE — 36415 COLL VENOUS BLD VENIPUNCTURE: CPT | Performed by: EMERGENCY MEDICINE

## 2019-03-09 PROCEDURE — 84484 ASSAY OF TROPONIN QUANT: CPT | Performed by: EMERGENCY MEDICINE

## 2019-03-09 PROCEDURE — 85025 COMPLETE CBC W/AUTO DIFF WBC: CPT | Performed by: EMERGENCY MEDICINE

## 2019-03-09 PROCEDURE — 80053 COMPREHEN METABOLIC PANEL: CPT | Performed by: EMERGENCY MEDICINE

## 2019-03-09 PROCEDURE — 82962 GLUCOSE BLOOD TEST: CPT

## 2019-03-09 PROCEDURE — 85610 PROTHROMBIN TIME: CPT | Performed by: EMERGENCY MEDICINE

## 2019-03-09 PROCEDURE — 85730 THROMBOPLASTIN TIME PARTIAL: CPT | Performed by: EMERGENCY MEDICINE

## 2019-03-09 RX ORDER — ASPIRIN 81 MG/1
324 TABLET, CHEWABLE ORAL ONCE
Status: COMPLETED | OUTPATIENT
Start: 2019-03-09 | End: 2019-03-09

## 2019-03-10 ENCOUNTER — APPOINTMENT (OUTPATIENT)
Dept: ULTRASOUND IMAGING | Facility: HOSPITAL | Age: 74
End: 2019-03-10
Attending: HOSPITALIST
Payer: MEDICARE

## 2019-03-10 PROBLEM — E11.65 TYPE 2 DIABETES MELLITUS WITH HYPERGLYCEMIA, WITH LONG-TERM CURRENT USE OF INSULIN (HCC): Status: ACTIVE | Noted: 2019-03-10

## 2019-03-10 PROBLEM — N18.9 CHRONIC KIDNEY DISEASE, UNSPECIFIED CKD STAGE: Status: ACTIVE | Noted: 2019-03-10

## 2019-03-10 PROBLEM — E78.5 HYPERLIPIDEMIA, UNSPECIFIED HYPERLIPIDEMIA TYPE: Status: ACTIVE | Noted: 2019-03-10

## 2019-03-10 PROBLEM — I10 BENIGN ESSENTIAL HTN: Status: ACTIVE | Noted: 2019-03-10

## 2019-03-10 PROBLEM — Z79.4 TYPE 2 DIABETES MELLITUS WITH HYPERGLYCEMIA, WITH LONG-TERM CURRENT USE OF INSULIN (HCC): Status: ACTIVE | Noted: 2019-03-10

## 2019-03-10 PROBLEM — I25.10 CORONARY ARTERY DISEASE INVOLVING NATIVE CORONARY ARTERY OF NATIVE HEART, ANGINA PRESENCE UNSPECIFIED: Status: ACTIVE | Noted: 2019-03-10

## 2019-03-10 LAB
ANION GAP SERPL CALC-SCNC: 8 MMOL/L (ref 0–18)
ATRIAL RATE: 83 BPM
BASOPHILS # BLD AUTO: 0.01 X10(3) UL (ref 0–0.2)
BASOPHILS NFR BLD AUTO: 0.1 %
BUN BLD-MCNC: 54 MG/DL (ref 7–18)
BUN/CREAT SERPL: 27.6 (ref 10–20)
CALCIUM BLD-MCNC: 8.4 MG/DL (ref 8.5–10.1)
CHLORIDE SERPL-SCNC: 106 MMOL/L (ref 98–107)
CO2 SERPL-SCNC: 27 MMOL/L (ref 21–32)
CREAT BLD-MCNC: 1.96 MG/DL (ref 0.7–1.3)
D-DIMER: <0.27 UG/ML FEU (ref 0–0.49)
DEPRECATED RDW RBC AUTO: 41.1 FL (ref 35.1–46.3)
EOSINOPHIL # BLD AUTO: 0.02 X10(3) UL (ref 0–0.7)
EOSINOPHIL NFR BLD AUTO: 0.2 %
ERYTHROCYTE [DISTWIDTH] IN BLOOD BY AUTOMATED COUNT: 13.4 % (ref 11–15)
GLUCOSE BLD-MCNC: 151 MG/DL (ref 70–99)
GLUCOSE BLD-MCNC: 176 MG/DL (ref 70–99)
GLUCOSE BLD-MCNC: 194 MG/DL (ref 70–99)
GLUCOSE BLD-MCNC: 329 MG/DL (ref 70–99)
GLUCOSE BLD-MCNC: 90 MG/DL (ref 70–99)
HCT VFR BLD AUTO: 32.8 % (ref 39–53)
HGB BLD-MCNC: 10.9 G/DL (ref 13–17.5)
IMM GRANULOCYTES # BLD AUTO: 0.12 X10(3) UL (ref 0–1)
IMM GRANULOCYTES NFR BLD: 1 %
LYMPHOCYTES # BLD AUTO: 2.34 X10(3) UL (ref 1–4)
LYMPHOCYTES NFR BLD AUTO: 19.1 %
MCH RBC QN AUTO: 27.8 PG (ref 26–34)
MCHC RBC AUTO-ENTMCNC: 33.2 G/DL (ref 31–37)
MCV RBC AUTO: 83.7 FL (ref 80–100)
MONOCYTES # BLD AUTO: 0.73 X10(3) UL (ref 0.1–1)
MONOCYTES NFR BLD AUTO: 6 %
NEUTROPHILS # BLD AUTO: 9 X10 (3) UL (ref 1.5–7.7)
NEUTROPHILS # BLD AUTO: 9 X10(3) UL (ref 1.5–7.7)
NEUTROPHILS NFR BLD AUTO: 73.6 %
OSMOLALITY SERPL CALC.SUM OF ELEC: 311 MOSM/KG (ref 275–295)
P AXIS: 66 DEGREES
P-R INTERVAL: 148 MS
PLATELET # BLD AUTO: 269 10(3)UL (ref 150–450)
POTASSIUM SERPL-SCNC: 4.1 MMOL/L (ref 3.5–5.1)
Q-T INTERVAL: 390 MS
QRS DURATION: 122 MS
QTC CALCULATION (BEZET): 458 MS
R AXIS: -67 DEGREES
RBC # BLD AUTO: 3.92 X10(6)UL (ref 3.8–5.8)
SODIUM SERPL-SCNC: 141 MMOL/L (ref 136–145)
T AXIS: 48 DEGREES
TROPONIN I SERPL-MCNC: <0.045 NG/ML (ref ?–0.04)
TROPONIN I SERPL-MCNC: <0.045 NG/ML (ref ?–0.04)
VENTRICULAR RATE: 83 BPM
WBC # BLD AUTO: 12.2 X10(3) UL (ref 4–11)

## 2019-03-10 PROCEDURE — 96372 THER/PROPH/DIAG INJ SC/IM: CPT

## 2019-03-10 PROCEDURE — 93970 EXTREMITY STUDY: CPT | Performed by: HOSPITALIST

## 2019-03-10 PROCEDURE — 85025 COMPLETE CBC W/AUTO DIFF WBC: CPT | Performed by: HOSPITALIST

## 2019-03-10 PROCEDURE — 85378 FIBRIN DEGRADE SEMIQUANT: CPT | Performed by: INTERNAL MEDICINE

## 2019-03-10 PROCEDURE — 80048 BASIC METABOLIC PNL TOTAL CA: CPT | Performed by: HOSPITALIST

## 2019-03-10 PROCEDURE — 84484 ASSAY OF TROPONIN QUANT: CPT | Performed by: HOSPITALIST

## 2019-03-10 PROCEDURE — 96374 THER/PROPH/DIAG INJ IV PUSH: CPT

## 2019-03-10 PROCEDURE — 82962 GLUCOSE BLOOD TEST: CPT

## 2019-03-10 RX ORDER — ASPIRIN 81 MG/1
81 TABLET, CHEWABLE ORAL
Status: DISCONTINUED | OUTPATIENT
Start: 2019-03-10 | End: 2019-03-11

## 2019-03-10 RX ORDER — HEPARIN SODIUM 5000 [USP'U]/ML
5000 INJECTION, SOLUTION INTRAVENOUS; SUBCUTANEOUS EVERY 8 HOURS SCHEDULED
Status: DISCONTINUED | OUTPATIENT
Start: 2019-03-10 | End: 2019-03-11

## 2019-03-10 RX ORDER — FUROSEMIDE 40 MG/1
40 TABLET ORAL
Status: DISCONTINUED | OUTPATIENT
Start: 2019-03-10 | End: 2019-03-10

## 2019-03-10 RX ORDER — DEXTROSE MONOHYDRATE 25 G/50ML
50 INJECTION, SOLUTION INTRAVENOUS
Status: DISCONTINUED | OUTPATIENT
Start: 2019-03-10 | End: 2019-03-11

## 2019-03-10 RX ORDER — PREDNISONE 10 MG/1
10 TABLET ORAL ONCE
Status: COMPLETED | OUTPATIENT
Start: 2019-03-10 | End: 2019-03-10

## 2019-03-10 RX ORDER — LOSARTAN POTASSIUM 50 MG/1
50 TABLET ORAL DAILY
Status: DISCONTINUED | OUTPATIENT
Start: 2019-03-10 | End: 2019-03-11

## 2019-03-10 RX ORDER — PANTOPRAZOLE SODIUM 40 MG/1
40 TABLET, DELAYED RELEASE ORAL
Status: DISCONTINUED | OUTPATIENT
Start: 2019-03-10 | End: 2019-03-11

## 2019-03-10 RX ORDER — FUROSEMIDE 10 MG/ML
40 INJECTION INTRAMUSCULAR; INTRAVENOUS ONCE
Status: COMPLETED | OUTPATIENT
Start: 2019-03-10 | End: 2019-03-10

## 2019-03-10 RX ORDER — FUROSEMIDE 40 MG/1
40 TABLET ORAL
Status: DISCONTINUED | OUTPATIENT
Start: 2019-03-11 | End: 2019-03-11

## 2019-03-10 RX ORDER — PREDNISONE 10 MG/1
10 TABLET ORAL DAILY
Status: DISCONTINUED | OUTPATIENT
Start: 2019-03-10 | End: 2019-03-10

## 2019-03-10 RX ORDER — METOPROLOL TARTRATE 50 MG/1
50 TABLET, FILM COATED ORAL
Status: DISCONTINUED | OUTPATIENT
Start: 2019-03-10 | End: 2019-03-11

## 2019-03-10 RX ORDER — ALLOPURINOL 100 MG/1
100 TABLET ORAL DAILY
Status: DISCONTINUED | OUTPATIENT
Start: 2019-03-10 | End: 2019-03-11

## 2019-03-10 RX ORDER — PREDNISONE 20 MG/1
20 TABLET ORAL DAILY
Status: DISCONTINUED | OUTPATIENT
Start: 2019-03-11 | End: 2019-03-11

## 2019-03-10 RX ORDER — GABAPENTIN 600 MG/1
600 TABLET ORAL 2 TIMES DAILY
Status: DISCONTINUED | OUTPATIENT
Start: 2019-03-10 | End: 2019-03-11

## 2019-03-10 RX ORDER — AMLODIPINE BESYLATE 5 MG/1
10 TABLET ORAL EVERY EVENING
Status: DISCONTINUED | OUTPATIENT
Start: 2019-03-10 | End: 2019-03-11

## 2019-03-10 RX ORDER — ATORVASTATIN CALCIUM 40 MG/1
40 TABLET, FILM COATED ORAL DAILY
Status: DISCONTINUED | OUTPATIENT
Start: 2019-03-10 | End: 2019-03-11

## 2019-03-10 NOTE — H&P
[facility] Hospitalist History and Physical      CC: Chest pain    PCP: Sina Feliz MD    History of Present Illness: Patient is a 75year old male with PMH sig for CKD, DM, HTN, hx of NHL in remission presenting with episdoes of CP on 3/8.  Was after a meal. F Murmur     since childbirth   • Muscle weakness    • Neuropathy    • [facility] 12[facility]15[facility]2010    Dr. Claudetta Maxcy ( last visit 12/17)  at [facility], treated with rituxan, now in remission   • Osteoarthritis    • Personal history of antineoplastic richie mouth nightly. Disp: 90 tablet Rfl: 4   Losartan Potassium 50 MG Oral Tab Take 50 mg by mouth nightly.    Disp: 180 tablet Rfl: 0   furosemide 40 MG Oral Tab Take 1 tablet twice a day Disp:  Rfl:    AmLODIPine Besylate 10 MG Oral Tab Take 1 tablet (10 mg Oral Daily   • Pantoprazole Sodium  40 mg Oral QAM AC     Continuous Infusions:   PRN: glucose **OR** Glucose-Vitamin C **OR** dextrose **OR** glucose **OR** Glucose-Vitamin C    Allergies:  No Known Allergies     Past Social Hx:  Denies heavy ETOH use  De kidney disease, unspecified CKD stage    Coronary artery disease involving native coronary artery of native heart, angina presence unspecified    Benign essential HTN    74 yo M with one episode of CP over 24 hours ago.     # Chest pain  - Trop neg, EKG new

## 2019-03-10 NOTE — PLAN OF CARE
Pt alert  able to make needs known. Denies any pain. C/o of gen LE weakness. Scheduled for  a nuc stress test tomorrow. Plan of care discussed with pt and family at bedside   Call light within reach. Will cont to monitor.

## 2019-03-10 NOTE — ED INITIAL ASSESSMENT (HPI)
Patient currently has no complaints on arrival howver last night at approximately 2300 he began having pain in his left arm, upper back, and jaw which resolved at midnight. Patient denies having any symptoms since that time. Denies any CP/SOB.  Denies dizzi

## 2019-03-10 NOTE — ED PROVIDER NOTES
Patient Seen in: [facility] Emergency Department    History   Patient presents with:  Chest Pain Angina (cardiovascular)    Stated Complaint:     HPI    16-year-old male with a history of coronary artery disease, history of hypertension, history of 3, GFR 30-59 ml/min ([facility] 12/06/2012    Dr. Colonel Diaz sees every 6 months   • Clinically significant macular edema 4/3/17    L eye Dr. Pasha Johnson   • Coronary atherosclerosis of unspecified type of vessel, native or graft     sees every 2 years, Dr. Heidi Amin, charlee [facility]           Social History    Tobacco Use      Smoking status: Former Smoker        Packs/day: 1.00        Years: 37.00        Pack years: 37        Types: Cigarettes        Quit date: 2005        Years since quittin.1      Smokeless tob components:       Result Value    Glucose 370 (*)     Sodium 135 (*)     BUN 54 (*)     Creatinine 2.24 (*)     BUN/CREA Ratio 24.1 (*)     Calculated Osmolality 310 (*)     GFR, Non- 28 (*)     GFR, -American 32 (*)     Albumin 3.2 administered 4 baby aspirin medications for the purposes of treating  [chest pain. The patient had good response to these medications. Laboratory data:  Troponin is negative. INR 0.87, PT 12.2  WBC 14, hemoglobin 12.6, platelet count is 486.   Glucose

## 2019-03-10 NOTE — PLAN OF CARE
CARDIOVASCULAR - ADULT    • Maintains optimal cardiac output and hemodynamic stability Progressing    • Absence of cardiac arrhythmias or at baseline Progressing        SAFETY ADULT - FALL    • Free from fall injury Progressing

## 2019-03-10 NOTE — ED NOTES
Patient awake and alert in no acute distress with no complaints. Resting comfortably in the stretcher.

## 2019-03-10 NOTE — CONSULTS
[facility] Cardiology Consultation    Merline Solid Patient Status:  Observation    1945 MRN [id number]   [facility] 2NE-A Attending Alla Yost,*   Hosp Day # 0 PCP James Siddiqi MD     Reason for Consultation:  Chest pain place 12/6/2012     3/19/14   • Murmur     since childbirth   • Muscle weakness    • Neuropathy    • [facility]Hodgkin's [facility] 12/15/2010    Dr. Rut Rendon ( last visit 12/17)  at [facility], treated with rituxan, now in remission   • Osteoarthritis    • Person Aspart Pen  25 Units Subcutaneous TID CC   • Losartan Potassium  50 mg Oral Daily   • aspirin  81 mg Oral Daily   • Metoprolol Tartrate  50 mg Oral 2x Daily(Beta Blocker)   • NIFEdipine ER  90 mg Oral Daily   • predniSONE  10 mg Oral Daily   • cholecalcife PTP  12.2*   INR  0.87*       Recent Labs   Lab  03/09/19   2154  03/10/19   0618   TROP  <0.045  <0.045         Impression:   1. 10 minute episode of chest, jaw, arm pain 3/8 pm.  No sx's since. EKG does show a new RBBB.   2. LE weakness, markedly eleva

## 2019-03-11 ENCOUNTER — APPOINTMENT (OUTPATIENT)
Dept: CV DIAGNOSTICS | Facility: HOSPITAL | Age: 74
End: 2019-03-11
Attending: INTERNAL MEDICINE
Payer: MEDICARE

## 2019-03-11 VITALS
BODY MASS INDEX: 24.78 KG/M2 | SYSTOLIC BLOOD PRESSURE: 133 MMHG | TEMPERATURE: 99 F | OXYGEN SATURATION: 96 % | HEIGHT: 69 IN | RESPIRATION RATE: 18 BRPM | WEIGHT: 167.31 LBS | DIASTOLIC BLOOD PRESSURE: 51 MMHG | HEART RATE: 73 BPM

## 2019-03-11 LAB
ANION GAP SERPL CALC-SCNC: 10 MMOL/L (ref 0–18)
BUN BLD-MCNC: 71 MG/DL (ref 7–18)
BUN/CREAT SERPL: 31.4 (ref 10–20)
CALCIUM BLD-MCNC: 8.4 MG/DL (ref 8.5–10.1)
CHLORIDE SERPL-SCNC: 102 MMOL/L (ref 98–107)
CO2 SERPL-SCNC: 27 MMOL/L (ref 21–32)
CREAT BLD-MCNC: 2.26 MG/DL (ref 0.7–1.3)
GLUCOSE BLD-MCNC: 111 MG/DL (ref 70–99)
GLUCOSE BLD-MCNC: 126 MG/DL (ref 70–99)
GLUCOSE BLD-MCNC: 155 MG/DL (ref 70–99)
GLUCOSE BLD-MCNC: 181 MG/DL (ref 70–99)
GLUCOSE BLD-MCNC: 43 MG/DL (ref 70–99)
GLUCOSE BLD-MCNC: 89 MG/DL (ref 70–99)
OSMOLALITY SERPL CALC.SUM OF ELEC: 308 MOSM/KG (ref 275–295)
POTASSIUM SERPL-SCNC: 3.4 MMOL/L (ref 3.5–5.1)
POTASSIUM SERPL-SCNC: 4 MMOL/L (ref 3.5–5.1)
SODIUM SERPL-SCNC: 139 MMOL/L (ref 136–145)

## 2019-03-11 PROCEDURE — 78452 HT MUSCLE IMAGE SPECT MULT: CPT | Performed by: INTERNAL MEDICINE

## 2019-03-11 PROCEDURE — 93018 CV STRESS TEST I&R ONLY: CPT | Performed by: INTERNAL MEDICINE

## 2019-03-11 PROCEDURE — 96375 TX/PRO/DX INJ NEW DRUG ADDON: CPT

## 2019-03-11 PROCEDURE — 93017 CV STRESS TEST TRACING ONLY: CPT | Performed by: INTERNAL MEDICINE

## 2019-03-11 PROCEDURE — 96372 THER/PROPH/DIAG INJ SC/IM: CPT

## 2019-03-11 PROCEDURE — 82962 GLUCOSE BLOOD TEST: CPT

## 2019-03-11 PROCEDURE — 84132 ASSAY OF SERUM POTASSIUM: CPT | Performed by: HOSPITALIST

## 2019-03-11 PROCEDURE — 80048 BASIC METABOLIC PNL TOTAL CA: CPT | Performed by: HOSPITALIST

## 2019-03-11 RX ORDER — POTASSIUM CHLORIDE 20 MEQ/1
40 TABLET, EXTENDED RELEASE ORAL ONCE
Status: COMPLETED | OUTPATIENT
Start: 2019-03-11 | End: 2019-03-11

## 2019-03-11 RX ORDER — DEXTROSE MONOHYDRATE 25 G/50ML
50 INJECTION, SOLUTION INTRAVENOUS
Status: DISCONTINUED | OUTPATIENT
Start: 2019-03-11 | End: 2019-03-11

## 2019-03-11 RX ORDER — PANTOPRAZOLE SODIUM 40 MG/1
40 TABLET, DELAYED RELEASE ORAL
Qty: 30 TABLET | Refills: 3 | Status: SHIPPED | OUTPATIENT
Start: 2019-03-11 | End: 2019-05-10

## 2019-03-11 NOTE — PROGRESS NOTES
CARDIODIAGNOSTIC PRELIMINARY REPORT:       LEXISCAN injection completed with no new EKG changes noted;  PVC's       Denied cardiac symptoms       Base:   118/62, HR: 64;  Peak:  130/64  Second set of images pending

## 2019-03-11 NOTE — PLAN OF CARE
Assumed car of pt at [address] [place] [address]. Pt blood sugar 43. Hypoglycemia protocol initiated. New orders obtained. Pt scheduled for stress test in the morning, test done.  Results are normal per Dr Rebecca Shen ok for dc  Plan of care discussed with pt and wife, Shahrzad Beard

## 2019-03-11 NOTE — PLAN OF CARE
CARDIOVASCULAR - ADULT    • Maintains optimal cardiac output and hemodynamic stability Progressing    • Absence of cardiac arrhythmias or at baseline Progressing        SAFETY ADULT - FALL    • Free from fall injury Progressing          Assumed care of pt

## 2019-03-11 NOTE — PROGRESS NOTES
Pt bedtime sugar 329- dr. Noel Hoyt paged. 20 units of novolog ordered and levemir to be kept at the scheduled time ( 1am and 1 pm). Will continue to monitor.

## 2019-03-11 NOTE — PROGRESS NOTES
NURSING DISCHARGE NOTE    Discharged to home  via w/c . Accompanied by wife  Belongings taken with      Pt discharged to home . Discharge instructions including follow ups and scripts  reviewed with pt and spouse. All questions answered.  PIV removed p

## 2019-03-11 NOTE — PROGRESS NOTES
[facility] Cardiology Progress Note        Bibianajoann Daniels Patient Status:  Observation    1945 MRN [id number]   [facility] 2NE-A Attending Clois Party,*   Hosp Day # 0 PCP MD Joycelyn Mcconnell 03/10/19   0618   WBC  14.0*  12.2*   HGB  12.6*  10.9*   PLT  317.0  269.0       Chem:  Recent Labs   Lab  03/09/19   2154  03/10/19   0618  03/11/19   0544  03/11/19   1304   NA  135*  141  139   --    K  4.7  4.1  3.4*  4.0   CL  101  106  102   --    C

## 2019-03-11 NOTE — DISCHARGE SUMMARY
General Medicine Discharge Summary     Patient ID:  Coby Stevenson  75year old  1/1/1945    Admit date: 3/9/2019    Discharge date and time: 3/11/2019    Attending Physician: Hemanth Kc Baseline, monitor closely     # HTN: BP OK, home meds     # Possible PMR: Continue home dose prednisone and taper as directed  -pt advised to contact rheumatologist with any questions or concerns     # NHL  - pt/wife report there have been concerns for rec he began having pain in his left arm, upper back, and jaw which resolved at midnight. FINDINGS:  Minimal subsegmental atelectasis along the right minor fissure. Stable calcified granuloma within the region of the lingula. Lungs are otherwise clear.   No ASPIRIN 81 MG OR TABS  1 TABLET DAILY    !! ACCU-CHEK GUIDE In Vitro Strip  Check blood sugars 3 times per day    ACCU-CHEK FASTCLIX LANCETS Does not apply Misc  Check blood sugars 3 times per day    !! KACIE CONTOUR TEST In Vitro Strip  USE ONE STRIP TO C

## 2019-03-18 ENCOUNTER — LAB ENCOUNTER (OUTPATIENT)
Dept: LAB | Age: 74
End: 2019-03-18
Attending: INTERNAL MEDICINE
Payer: MEDICARE

## 2019-03-18 DIAGNOSIS — Z85.79 HISTORY OF LYMPHOMA: ICD-10-CM

## 2019-03-18 DIAGNOSIS — R70.0 ELEVATED SED RATE: ICD-10-CM

## 2019-03-18 PROCEDURE — 86162 COMPLEMENT TOTAL (CH50): CPT

## 2019-03-18 PROCEDURE — 82595 ASSAY OF CRYOGLOBULIN: CPT

## 2019-03-18 PROCEDURE — 36415 COLL VENOUS BLD VENIPUNCTURE: CPT

## 2019-03-18 PROCEDURE — 82784 ASSAY IGA/IGD/IGG/IGM EACH: CPT

## 2019-03-19 ENCOUNTER — TELEPHONE (OUTPATIENT)
Dept: RHEUMATOLOGY | Facility: CLINIC | Age: 74
End: 2019-03-19

## 2019-03-19 NOTE — TELEPHONE ENCOUNTER
Called and spoke with pt's son. Son states pt 'has an appointment with the oncologist this Thursday. Will try to get blood work done and then tamra back to schedule appointment.  Does not want more prednisone.' mp

## 2019-03-20 LAB — COMPLEMENT ACTIVITY, TOTAL EIA: 123 CAE UNITS

## 2019-03-23 LAB
CRYOGLOBULIN QUANTITATIVE SCREEN: POSITIVE
IGA, CRYOPRECIPITATE: 3 MG/DL
IGG, CRYOPRECIPITATE: 7 MG/DL
IGM, CRYOPRECIPITATE: 23 MG/DL

## 2019-05-17 ENCOUNTER — LAB ENCOUNTER (OUTPATIENT)
Dept: LAB | Age: 74
End: 2019-05-17
Attending: INTERNAL MEDICINE
Payer: MEDICARE

## 2019-05-17 DIAGNOSIS — E78.5 HYPERLIPIDEMIA LDL GOAL <70: ICD-10-CM

## 2019-05-17 DIAGNOSIS — I25.10 CORONARY ARTERY DISEASE INVOLVING NATIVE CORONARY ARTERY OF NATIVE HEART, ANGINA PRESENCE UNSPECIFIED: ICD-10-CM

## 2019-05-17 DIAGNOSIS — I35.0 NONRHEUMATIC AORTIC VALVE STENOSIS: ICD-10-CM

## 2019-05-17 DIAGNOSIS — I50.32 CHRONIC DIASTOLIC CHF (CONGESTIVE HEART FAILURE) (HCC): ICD-10-CM

## 2019-05-17 DIAGNOSIS — I10 HYPERTENSION, ESSENTIAL, BENIGN: ICD-10-CM

## 2019-05-17 DIAGNOSIS — N18.9 CHRONIC KIDNEY DISEASE, UNSPECIFIED CKD STAGE: ICD-10-CM

## 2019-05-17 PROCEDURE — 36415 COLL VENOUS BLD VENIPUNCTURE: CPT

## 2019-05-17 PROCEDURE — 80048 BASIC METABOLIC PNL TOTAL CA: CPT

## 2019-05-20 RX ORDER — ALLOPURINOL 100 MG/1
TABLET ORAL
Qty: 90 TABLET | Refills: 0 | Status: SHIPPED | OUTPATIENT
Start: 2019-05-20 | End: 2020-01-08 | Stop reason: DRUGHIGH

## 2019-06-17 ENCOUNTER — LAB ENCOUNTER (OUTPATIENT)
Dept: LAB | Facility: HOSPITAL | Age: 74
End: 2019-06-17
Attending: INTERNAL MEDICINE
Payer: MEDICARE

## 2019-06-17 DIAGNOSIS — N18.30 CHRONIC KIDNEY DISEASE, STAGE III (MODERATE) (HCC): ICD-10-CM

## 2019-06-17 DIAGNOSIS — I10 ESSENTIAL HYPERTENSION, MALIGNANT: ICD-10-CM

## 2019-06-17 DIAGNOSIS — E55.9 AVITAMINOSIS D: ICD-10-CM

## 2019-06-17 DIAGNOSIS — E11.8 DIABETIC COMPLICATION (HCC): ICD-10-CM

## 2019-06-17 PROCEDURE — 82570 ASSAY OF URINE CREATININE: CPT

## 2019-06-17 PROCEDURE — 83735 ASSAY OF MAGNESIUM: CPT

## 2019-06-17 PROCEDURE — 36415 COLL VENOUS BLD VENIPUNCTURE: CPT

## 2019-06-17 PROCEDURE — 83970 ASSAY OF PARATHORMONE: CPT

## 2019-06-17 PROCEDURE — 84100 ASSAY OF PHOSPHORUS: CPT

## 2019-06-17 PROCEDURE — 80053 COMPREHEN METABOLIC PANEL: CPT

## 2019-06-17 PROCEDURE — 85025 COMPLETE CBC W/AUTO DIFF WBC: CPT

## 2019-06-17 PROCEDURE — 82306 VITAMIN D 25 HYDROXY: CPT

## 2019-06-17 PROCEDURE — 84156 ASSAY OF PROTEIN URINE: CPT

## 2019-06-17 PROCEDURE — 81003 URINALYSIS AUTO W/O SCOPE: CPT

## 2019-07-02 ENCOUNTER — LAB ENCOUNTER (OUTPATIENT)
Dept: LAB | Age: 74
End: 2019-07-02
Attending: INTERNAL MEDICINE
Payer: MEDICARE

## 2019-07-02 DIAGNOSIS — I10 ESSENTIAL HYPERTENSION, MALIGNANT: ICD-10-CM

## 2019-07-02 DIAGNOSIS — E11.8 DIABETIC COMPLICATION (HCC): ICD-10-CM

## 2019-07-02 DIAGNOSIS — N18.30 CHRONIC KIDNEY DISEASE, STAGE III (MODERATE) (HCC): Primary | ICD-10-CM

## 2019-07-02 DIAGNOSIS — E55.9 VITAMIN D DEFICIENCY: ICD-10-CM

## 2019-07-02 LAB
ALBUMIN SERPL-MCNC: 3.1 G/DL (ref 3.4–5)
ALBUMIN/GLOB SERPL: 0.6 {RATIO} (ref 1–2)
ALP LIVER SERPL-CCNC: 98 U/L (ref 45–117)
ALT SERPL-CCNC: 73 U/L (ref 16–61)
ANION GAP SERPL CALC-SCNC: 8 MMOL/L (ref 0–18)
AST SERPL-CCNC: 46 U/L (ref 15–37)
BASOPHILS # BLD AUTO: 0.04 X10(3) UL (ref 0–0.2)
BASOPHILS NFR BLD AUTO: 0.4 %
BILIRUB SERPL-MCNC: 0.5 MG/DL (ref 0.1–2)
BILIRUB UR QL STRIP.AUTO: NEGATIVE
BUN BLD-MCNC: 30 MG/DL (ref 7–18)
BUN/CREAT SERPL: 18.3 (ref 10–20)
CALCIUM BLD-MCNC: 9.4 MG/DL (ref 8.5–10.1)
CHLORIDE SERPL-SCNC: 104 MMOL/L (ref 98–112)
CLARITY UR REFRACT.AUTO: CLEAR
CO2 SERPL-SCNC: 25 MMOL/L (ref 21–32)
COLOR UR AUTO: YELLOW
CREAT BLD-MCNC: 1.64 MG/DL (ref 0.7–1.3)
DEPRECATED RDW RBC AUTO: 55.4 FL (ref 35.1–46.3)
EOSINOPHIL # BLD AUTO: 0.25 X10(3) UL (ref 0–0.7)
EOSINOPHIL NFR BLD AUTO: 2.4 %
ERYTHROCYTE [DISTWIDTH] IN BLOOD BY AUTOMATED COUNT: 18.5 % (ref 11–15)
EST. AVERAGE GLUCOSE BLD GHB EST-MCNC: 206 MG/DL (ref 68–126)
GLOBULIN PLAS-MCNC: 5.1 G/DL (ref 2.8–4.4)
GLUCOSE BLD-MCNC: 112 MG/DL (ref 70–99)
GLUCOSE UR STRIP.AUTO-MCNC: NEGATIVE MG/DL
HAV IGM SER QL: 2 MG/DL (ref 1.6–2.6)
HBA1C MFR BLD HPLC: 8.8 % (ref ?–5.7)
HCT VFR BLD AUTO: 40.7 % (ref 39–53)
HGB BLD-MCNC: 12.9 G/DL (ref 13–17.5)
HYALINE CASTS #/AREA URNS AUTO: PRESENT /LPF
IGA SERPL-MCNC: 162 MG/DL (ref 70–312)
IGM SERPL-MCNC: 1910 MG/DL (ref 43–279)
IMM GRANULOCYTES # BLD AUTO: 0.02 X10(3) UL (ref 0–1)
IMM GRANULOCYTES NFR BLD: 0.2 %
IMMUNOGLOBULIN PNL SER-MCNC: 933 MG/DL (ref 791–1643)
KETONES UR STRIP.AUTO-MCNC: NEGATIVE MG/DL
LEUKOCYTE ESTERASE UR QL STRIP.AUTO: NEGATIVE
LYMPHOCYTES # BLD AUTO: 4.53 X10(3) UL (ref 1–4)
LYMPHOCYTES NFR BLD AUTO: 42.9 %
M PROTEIN MFR SERPL ELPH: 8.2 G/DL (ref 6.4–8.2)
MCH RBC QN AUTO: 26.4 PG (ref 26–34)
MCHC RBC AUTO-ENTMCNC: 31.7 G/DL (ref 31–37)
MCV RBC AUTO: 83.2 FL (ref 80–100)
MONOCYTES # BLD AUTO: 0.66 X10(3) UL (ref 0.1–1)
MONOCYTES NFR BLD AUTO: 6.3 %
NEUTROPHILS # BLD AUTO: 5.05 X10 (3) UL (ref 1.5–7.7)
NEUTROPHILS # BLD AUTO: 5.05 X10(3) UL (ref 1.5–7.7)
NEUTROPHILS NFR BLD AUTO: 47.8 %
NITRITE UR QL STRIP.AUTO: NEGATIVE
OSMOLALITY SERPL CALC.SUM OF ELEC: 291 MOSM/KG (ref 275–295)
PH UR STRIP.AUTO: 5 [PH] (ref 4.5–8)
PHOSPHATE SERPL-MCNC: 4 MG/DL (ref 2.5–4.9)
PLATELET # BLD AUTO: 349 10(3)UL (ref 150–450)
POTASSIUM SERPL-SCNC: 3.7 MMOL/L (ref 3.5–5.1)
PROT UR STRIP.AUTO-MCNC: 30 MG/DL
PROT UR-MCNC: 90.2 MG/DL
PTH-INTACT SERPL-MCNC: 48.9 PG/ML (ref 18.5–88)
RBC # BLD AUTO: 4.89 X10(6)UL (ref 3.8–5.8)
RBC UR QL AUTO: NEGATIVE
SODIUM SERPL-SCNC: 137 MMOL/L (ref 136–145)
SP GR UR STRIP.AUTO: 1.01 (ref 1–1.03)
URATE SERPL-MCNC: 8 MG/DL (ref 3.5–7.2)
UROBILINOGEN UR STRIP.AUTO-MCNC: <2 MG/DL
VIT D+METAB SERPL-MCNC: 51 NG/ML (ref 30–100)
WBC # BLD AUTO: 10.6 X10(3) UL (ref 4–11)

## 2019-07-02 PROCEDURE — 83970 ASSAY OF PARATHORMONE: CPT

## 2019-07-02 PROCEDURE — 84550 ASSAY OF BLOOD/URIC ACID: CPT

## 2019-07-02 PROCEDURE — 82306 VITAMIN D 25 HYDROXY: CPT

## 2019-07-02 PROCEDURE — 83735 ASSAY OF MAGNESIUM: CPT

## 2019-07-02 PROCEDURE — 82784 ASSAY IGA/IGD/IGG/IGM EACH: CPT

## 2019-07-02 PROCEDURE — 84156 ASSAY OF PROTEIN URINE: CPT

## 2019-07-02 PROCEDURE — 85025 COMPLETE CBC W/AUTO DIFF WBC: CPT

## 2019-07-02 PROCEDURE — 83036 HEMOGLOBIN GLYCOSYLATED A1C: CPT

## 2019-07-02 PROCEDURE — 80053 COMPREHEN METABOLIC PANEL: CPT

## 2019-07-02 PROCEDURE — 36415 COLL VENOUS BLD VENIPUNCTURE: CPT

## 2019-07-02 PROCEDURE — 84100 ASSAY OF PHOSPHORUS: CPT

## 2019-07-02 PROCEDURE — 81001 URINALYSIS AUTO W/SCOPE: CPT

## 2019-09-03 ENCOUNTER — LAB ENCOUNTER (OUTPATIENT)
Dept: LAB | Age: 74
End: 2019-09-03
Attending: INTERNAL MEDICINE
Payer: MEDICARE

## 2019-09-03 DIAGNOSIS — C88.0 MACROGLOBULINEMIA (HCC): ICD-10-CM

## 2019-09-03 DIAGNOSIS — C61 MALIGNANT NEOPLASM OF PROSTATE (HCC): ICD-10-CM

## 2019-09-03 DIAGNOSIS — I10 ESSENTIAL HYPERTENSION, MALIGNANT: ICD-10-CM

## 2019-09-03 DIAGNOSIS — N18.30 CHRONIC KIDNEY DISEASE, STAGE III (MODERATE) (HCC): Primary | ICD-10-CM

## 2019-09-03 DIAGNOSIS — E11.8 DIABETIC COMPLICATION (HCC): ICD-10-CM

## 2019-09-03 DIAGNOSIS — E87.1 HYPOSMOLALITY SYNDROME: ICD-10-CM

## 2019-09-03 DIAGNOSIS — M35.3 POLYMYALGIA RHEUMATICA (HCC): ICD-10-CM

## 2019-09-03 DIAGNOSIS — E55.9 VITAMIN D DEFICIENCY: ICD-10-CM

## 2019-09-03 LAB
ALBUMIN SERPL-MCNC: 3.6 G/DL (ref 3.4–5)
ALBUMIN/GLOB SERPL: 0.7 {RATIO} (ref 1–2)
ALP LIVER SERPL-CCNC: 107 U/L (ref 45–117)
ALT SERPL-CCNC: 78 U/L (ref 16–61)
ANION GAP SERPL CALC-SCNC: 9 MMOL/L (ref 0–18)
AST SERPL-CCNC: 43 U/L (ref 15–37)
BASOPHILS # BLD AUTO: 0.04 X10(3) UL (ref 0–0.2)
BASOPHILS NFR BLD AUTO: 0.3 %
BILIRUB SERPL-MCNC: 0.5 MG/DL (ref 0.1–2)
BILIRUB UR QL STRIP.AUTO: NEGATIVE
BUN BLD-MCNC: 43 MG/DL (ref 7–18)
BUN/CREAT SERPL: 23 (ref 10–20)
CALCIUM BLD-MCNC: 9.4 MG/DL (ref 8.5–10.1)
CHLORIDE SERPL-SCNC: 105 MMOL/L (ref 98–112)
CLARITY UR REFRACT.AUTO: CLEAR
CO2 SERPL-SCNC: 26 MMOL/L (ref 21–32)
CREAT BLD-MCNC: 1.87 MG/DL (ref 0.7–1.3)
CREAT UR-SCNC: 32.4 MG/DL
DEPRECATED RDW RBC AUTO: 51.6 FL (ref 35.1–46.3)
EOSINOPHIL # BLD AUTO: 0.27 X10(3) UL (ref 0–0.7)
EOSINOPHIL NFR BLD AUTO: 2.1 %
ERYTHROCYTE [DISTWIDTH] IN BLOOD BY AUTOMATED COUNT: 17.2 % (ref 11–15)
GLOBULIN PLAS-MCNC: 5.4 G/DL (ref 2.8–4.4)
GLUCOSE BLD-MCNC: 126 MG/DL (ref 70–99)
GLUCOSE UR STRIP.AUTO-MCNC: NEGATIVE MG/DL
HCT VFR BLD AUTO: 41 % (ref 39–53)
HGB BLD-MCNC: 13.5 G/DL (ref 13–17.5)
IGA SERPL-MCNC: 168 MG/DL (ref 70–312)
IGM SERPL-MCNC: 2240 MG/DL (ref 43–279)
IMM GRANULOCYTES # BLD AUTO: 0.03 X10(3) UL (ref 0–1)
IMM GRANULOCYTES NFR BLD: 0.2 %
IMMUNOGLOBULIN PNL SER-MCNC: 876 MG/DL (ref 791–1643)
KETONES UR STRIP.AUTO-MCNC: NEGATIVE MG/DL
LEUKOCYTE ESTERASE UR QL STRIP.AUTO: NEGATIVE
LYMPHOCYTES # BLD AUTO: 5.24 X10(3) UL (ref 1–4)
LYMPHOCYTES NFR BLD AUTO: 40.6 %
M PROTEIN MFR SERPL ELPH: 9 G/DL (ref 6.4–8.2)
MCH RBC QN AUTO: 28.2 PG (ref 26–34)
MCHC RBC AUTO-ENTMCNC: 32.9 G/DL (ref 31–37)
MCV RBC AUTO: 85.6 FL (ref 80–100)
MONOCYTES # BLD AUTO: 0.7 X10(3) UL (ref 0.1–1)
MONOCYTES NFR BLD AUTO: 5.4 %
NEUTROPHILS # BLD AUTO: 6.62 X10 (3) UL (ref 1.5–7.7)
NEUTROPHILS # BLD AUTO: 6.62 X10(3) UL (ref 1.5–7.7)
NEUTROPHILS NFR BLD AUTO: 51.4 %
NITRITE UR QL STRIP.AUTO: NEGATIVE
OSMOLALITY SERPL CALC.SUM OF ELEC: 302 MOSM/KG (ref 275–295)
PH UR STRIP.AUTO: 5 [PH] (ref 4.5–8)
PHOSPHATE SERPL-MCNC: 4.2 MG/DL (ref 2.5–4.9)
PLATELET # BLD AUTO: 396 10(3)UL (ref 150–450)
POTASSIUM SERPL-SCNC: 4.3 MMOL/L (ref 3.5–5.1)
PROT UR STRIP.AUTO-MCNC: NEGATIVE MG/DL
PROT UR-MCNC: 27.6 MG/DL
RBC # BLD AUTO: 4.79 X10(6)UL (ref 3.8–5.8)
RBC UR QL AUTO: NEGATIVE
SODIUM SERPL-SCNC: 140 MMOL/L (ref 136–145)
SP GR UR STRIP.AUTO: 1.01 (ref 1–1.03)
UROBILINOGEN UR STRIP.AUTO-MCNC: <2 MG/DL
VIT D+METAB SERPL-MCNC: 48.3 NG/ML (ref 30–100)
WBC # BLD AUTO: 12.9 X10(3) UL (ref 4–11)

## 2019-09-03 PROCEDURE — 82570 ASSAY OF URINE CREATININE: CPT

## 2019-09-03 PROCEDURE — 85025 COMPLETE CBC W/AUTO DIFF WBC: CPT

## 2019-09-03 PROCEDURE — 84156 ASSAY OF PROTEIN URINE: CPT

## 2019-09-03 PROCEDURE — 80053 COMPREHEN METABOLIC PANEL: CPT

## 2019-09-03 PROCEDURE — 82784 ASSAY IGA/IGD/IGG/IGM EACH: CPT

## 2019-09-03 PROCEDURE — 82306 VITAMIN D 25 HYDROXY: CPT

## 2019-09-03 PROCEDURE — 84100 ASSAY OF PHOSPHORUS: CPT

## 2019-09-03 PROCEDURE — 81003 URINALYSIS AUTO W/O SCOPE: CPT

## 2019-09-03 PROCEDURE — 36415 COLL VENOUS BLD VENIPUNCTURE: CPT

## 2019-09-14 PROBLEM — C88.0 WALDENSTROM MACROGLOBULINEMIA (HCC): Status: ACTIVE | Noted: 2019-09-14

## 2019-10-04 PROBLEM — I50.32 DIASTOLIC DYSFUNCTION WITH CHRONIC HEART FAILURE (HCC): Status: ACTIVE | Noted: 2019-10-04

## 2019-11-05 ENCOUNTER — LAB ENCOUNTER (OUTPATIENT)
Dept: LAB | Age: 74
End: 2019-11-05
Attending: INTERNAL MEDICINE
Payer: MEDICARE

## 2019-11-05 DIAGNOSIS — C88.0 WALDENSTROM MACROGLOBULINEMIA (HCC): ICD-10-CM

## 2019-11-05 DIAGNOSIS — I10 ESSENTIAL HYPERTENSION: Primary | ICD-10-CM

## 2019-11-05 DIAGNOSIS — E11.8 TYPE II DIABETES MELLITUS WITH COMPLICATION (HCC): ICD-10-CM

## 2019-11-05 DIAGNOSIS — C61 MALIGNANT NEOPLASM OF PROSTATE (HCC): ICD-10-CM

## 2019-11-05 DIAGNOSIS — E55.9 VITAMIN D DEFICIENCY: ICD-10-CM

## 2019-11-05 PROCEDURE — 83970 ASSAY OF PARATHORMONE: CPT

## 2019-11-05 PROCEDURE — 82570 ASSAY OF URINE CREATININE: CPT

## 2019-11-05 PROCEDURE — 81001 URINALYSIS AUTO W/SCOPE: CPT

## 2019-11-05 PROCEDURE — 85025 COMPLETE CBC W/AUTO DIFF WBC: CPT

## 2019-11-05 PROCEDURE — 80053 COMPREHEN METABOLIC PANEL: CPT

## 2019-11-05 PROCEDURE — 36415 COLL VENOUS BLD VENIPUNCTURE: CPT

## 2019-11-05 PROCEDURE — 84156 ASSAY OF PROTEIN URINE: CPT

## 2019-11-05 PROCEDURE — 84550 ASSAY OF BLOOD/URIC ACID: CPT

## 2019-11-05 PROCEDURE — 82306 VITAMIN D 25 HYDROXY: CPT

## 2019-11-05 PROCEDURE — 84100 ASSAY OF PHOSPHORUS: CPT

## 2019-11-05 PROCEDURE — 82784 ASSAY IGA/IGD/IGG/IGM EACH: CPT

## 2019-12-04 ENCOUNTER — LAB ENCOUNTER (OUTPATIENT)
Dept: LAB | Age: 74
End: 2019-12-04
Attending: INTERNAL MEDICINE
Payer: MEDICARE

## 2019-12-04 DIAGNOSIS — M35.3 POLYMYALGIA RHEUMATICA (HCC): ICD-10-CM

## 2019-12-04 DIAGNOSIS — I10 ESSENTIAL HYPERTENSION, MALIGNANT: ICD-10-CM

## 2019-12-04 DIAGNOSIS — N18.30 CHRONIC KIDNEY DISEASE, STAGE III (MODERATE) (HCC): Primary | ICD-10-CM

## 2019-12-04 DIAGNOSIS — C61 MALIGNANT NEOPLASM OF PROSTATE (HCC): ICD-10-CM

## 2019-12-04 DIAGNOSIS — E55.9 AVITAMINOSIS D: ICD-10-CM

## 2019-12-04 DIAGNOSIS — C88.0 MACROGLOBULINEMIA (HCC): ICD-10-CM

## 2019-12-04 DIAGNOSIS — E11.8 DIABETIC COMPLICATION (HCC): ICD-10-CM

## 2019-12-04 DIAGNOSIS — E87.1 HYPOSMOLALITY SYNDROME: ICD-10-CM

## 2019-12-04 PROCEDURE — 84156 ASSAY OF PROTEIN URINE: CPT

## 2019-12-04 PROCEDURE — 36415 COLL VENOUS BLD VENIPUNCTURE: CPT

## 2019-12-04 PROCEDURE — 84100 ASSAY OF PHOSPHORUS: CPT

## 2019-12-04 PROCEDURE — 80053 COMPREHEN METABOLIC PANEL: CPT

## 2019-12-04 PROCEDURE — 85025 COMPLETE CBC W/AUTO DIFF WBC: CPT

## 2020-02-12 PROCEDURE — 88305 TISSUE EXAM BY PATHOLOGIST: CPT | Performed by: INTERNAL MEDICINE

## 2020-08-10 ENCOUNTER — HOSPITAL ENCOUNTER (EMERGENCY)
Facility: HOSPITAL | Age: 75
Discharge: HOME | End: 2020-08-10
Attending: EMERGENCY MEDICINE
Payer: MEDICARE

## 2020-08-10 VITALS
WEIGHT: 154 LBS | OXYGEN SATURATION: 95 % | TEMPERATURE: 98 F | HEART RATE: 71 BPM | HEIGHT: 68 IN | DIASTOLIC BLOOD PRESSURE: 49 MMHG | BODY MASS INDEX: 23.34 KG/M2 | RESPIRATION RATE: 16 BRPM | SYSTOLIC BLOOD PRESSURE: 139 MMHG

## 2020-08-10 DIAGNOSIS — S92.511B OPEN DISPLACED FRACTURE OF PROXIMAL PHALANX OF LESSER TOE OF RIGHT FOOT, INITIAL ENCOUNTER: Primary | ICD-10-CM

## 2020-08-10 PROCEDURE — 96365 THER/PROPH/DIAG IV INF INIT: CPT

## 2020-08-10 PROCEDURE — 90471 IMMUNIZATION ADMIN: CPT

## 2020-08-10 PROCEDURE — 99284 EMERGENCY DEPT VISIT MOD MDM: CPT

## 2020-08-10 PROCEDURE — 12001 RPR S/N/AX/GEN/TRNK 2.5CM/<: CPT

## 2020-08-10 PROCEDURE — 12001 RPR S/N/AX/GEN/TRNK 2.5CM/<: CPT | Performed by: PHYSICIAN ASSISTANT

## 2020-08-10 RX ORDER — CEPHALEXIN 500 MG/1
500 TABLET ORAL 3 TIMES DAILY
Qty: 30 TABLET | Refills: 0 | Status: SHIPPED | OUTPATIENT
Start: 2020-08-10 | End: 2020-08-20

## 2020-08-10 NOTE — ED INITIAL ASSESSMENT (HPI)
Pt reports falls last night down 10 stairs. Struck L side of head, denies LOC. Went to urgent care and had xray of R foot. Told to be seen in ER. Lacerations to R 4th and 5th toes. Bleeding controlled with pressure. Reports mild headache.  Denies other inju

## 2020-08-10 NOTE — ED PROVIDER NOTES
Patient Seen in: [facility] Emergency Department      History   Patient presents with:  Contusion    Stated Complaint: fall lac to toe, broken finger and toes    HPI     CHIEF COMPLAINT: Toe laceration, fracture    HISTORY OF PRESENT ILLNESS: Patien Musculoskeletal: as above  Skin: no lacerations  Neurological: no headache    Family history reviewed with the patient and is not contributory to the presenting problem      The patient's medication list, past medical history and social history elements is seed implant, Dr. Alex Smalls and  [facility], prostate rt base 3+3=6, rt mid 3+3=6, psa 3.9, sees Dr. lAex Smalls yearly   • Systolic murmur    • Type II or unspecified type diabetes mellitus without mention of complication, not stated as uncontrolled    • Unspecified essen ED Triage Vitals [08/10/20 1852]   BP (!) 195/67   Pulse 78   Resp 20   Temp 98.1 °F (36.7 °C)   Temp src Temporal   SpO2 98 %   O2 Device None (Room air)       Current:/49   Pulse 77   Temp 98.1 °F (36.7 °C) (Temporal)   Resp 14   Ht 172.7 cm (5' 8\ Technique: 3 views of the right foot and ankle was performed.         Comparison: None.        Findings/    Impression:        Acute mildly displaced fractures involving the fourth proximal phalanx shaft and base and fifth proximal phalanx shaft associated Verbal consent was obtained from the patient. The 0.5 cm laceration on the right foot fifth digit was anesthetized in the usual fashion. The laceration was anesthetized with lidocaine locally. The wound was cleansed and irrigated copiously.   The wound w Patient received 1 g of Ancef. Patient be discharged home on cephalexin for the next 10 days patient was placed on cephalexin 3 times daily secondary to his renal insufficiency. . Patient was also given a prescription for a cam boot so he may able to ambul Cam boot, right foot Dx: Fourth, fifth proximal toe fractures  Qty: 1 Units Refills: 0

## 2020-08-11 NOTE — CM/SW NOTE
Patient's wife and daughter called to report they can't get in with Dr. Sary Whitfield until Thursday and would like a sooner appt with another group. Gave them the [facility] ortho group's information.  She will call as soon as possible and call us back if she has any

## 2021-01-21 ENCOUNTER — LAB ENCOUNTER (OUTPATIENT)
Dept: LAB | Age: 76
End: 2021-01-21
Attending: INTERNAL MEDICINE
Payer: MEDICARE

## 2021-01-21 DIAGNOSIS — I50.32 CHRONIC DIASTOLIC CHF (CONGESTIVE HEART FAILURE) (HCC): ICD-10-CM

## 2021-01-21 DIAGNOSIS — R94.39 ABNORMAL NUCLEAR STRESS TEST: ICD-10-CM

## 2021-01-21 DIAGNOSIS — I34.0 NONRHEUMATIC MITRAL VALVE REGURGITATION: ICD-10-CM

## 2021-01-21 DIAGNOSIS — N18.4 CKD STAGE 4 DUE TO TYPE 2 DIABETES MELLITUS (HCC): ICD-10-CM

## 2021-01-21 DIAGNOSIS — E78.5 HYPERLIPIDEMIA LDL GOAL <70: ICD-10-CM

## 2021-01-21 DIAGNOSIS — I35.0 NONRHEUMATIC AORTIC VALVE STENOSIS: ICD-10-CM

## 2021-01-21 DIAGNOSIS — E11.22 CKD STAGE 4 DUE TO TYPE 2 DIABETES MELLITUS (HCC): ICD-10-CM

## 2021-01-21 DIAGNOSIS — I10 ESSENTIAL HYPERTENSION: ICD-10-CM

## 2021-01-21 LAB
ANION GAP SERPL CALC-SCNC: 6 MMOL/L (ref 0–18)
BASOPHILS # BLD AUTO: 0.03 X10(3) UL (ref 0–0.2)
BASOPHILS NFR BLD AUTO: 0.3 %
BUN BLD-MCNC: 45 MG/DL (ref 7–18)
BUN/CREAT SERPL: 21.7 (ref 10–20)
CALCIUM BLD-MCNC: 9.3 MG/DL (ref 8.5–10.1)
CHLORIDE SERPL-SCNC: 103 MMOL/L (ref 98–112)
CHOLEST SMN-MCNC: 114 MG/DL (ref ?–200)
CO2 SERPL-SCNC: 27 MMOL/L (ref 21–32)
CREAT BLD-MCNC: 2.07 MG/DL
DEPRECATED RDW RBC AUTO: 47.3 FL (ref 35.1–46.3)
EOSINOPHIL # BLD AUTO: 0.24 X10(3) UL (ref 0–0.7)
EOSINOPHIL NFR BLD AUTO: 2.2 %
ERYTHROCYTE [DISTWIDTH] IN BLOOD BY AUTOMATED COUNT: 13.9 % (ref 11–15)
GLUCOSE BLD-MCNC: 182 MG/DL (ref 70–99)
HCT VFR BLD AUTO: 38.3 %
HDLC SERPL-MCNC: 30 MG/DL (ref 40–59)
HGB BLD-MCNC: 12.3 G/DL
IMM GRANULOCYTES # BLD AUTO: 0.02 X10(3) UL (ref 0–1)
IMM GRANULOCYTES NFR BLD: 0.2 %
LDLC SERPL CALC-MCNC: 55 MG/DL (ref ?–100)
LYMPHOCYTES # BLD AUTO: 4.26 X10(3) UL (ref 1–4)
LYMPHOCYTES NFR BLD AUTO: 39 %
MCH RBC QN AUTO: 30.1 PG (ref 26–34)
MCHC RBC AUTO-ENTMCNC: 32.1 G/DL (ref 31–37)
MCV RBC AUTO: 93.9 FL
MONOCYTES # BLD AUTO: 0.59 X10(3) UL (ref 0.1–1)
MONOCYTES NFR BLD AUTO: 5.4 %
NEUTROPHILS # BLD AUTO: 5.79 X10 (3) UL (ref 1.5–7.7)
NEUTROPHILS # BLD AUTO: 5.79 X10(3) UL (ref 1.5–7.7)
NEUTROPHILS NFR BLD AUTO: 52.9 %
NONHDLC SERPL-MCNC: 84 MG/DL (ref ?–130)
OSMOLALITY SERPL CALC.SUM OF ELEC: 298 MOSM/KG (ref 275–295)
PATIENT FASTING Y/N/NP: YES
PATIENT FASTING Y/N/NP: YES
PLATELET # BLD AUTO: 303 10(3)UL (ref 150–450)
POTASSIUM SERPL-SCNC: 3.9 MMOL/L (ref 3.5–5.1)
RBC # BLD AUTO: 4.08 X10(6)UL
SODIUM SERPL-SCNC: 136 MMOL/L (ref 136–145)
TRIGL SERPL-MCNC: 147 MG/DL (ref 30–149)
VLDLC SERPL CALC-MCNC: 29 MG/DL (ref 0–30)
WBC # BLD AUTO: 10.9 X10(3) UL (ref 4–11)

## 2021-01-21 PROCEDURE — 85025 COMPLETE CBC W/AUTO DIFF WBC: CPT

## 2021-01-21 PROCEDURE — 80048 BASIC METABOLIC PNL TOTAL CA: CPT

## 2021-01-21 PROCEDURE — 36415 COLL VENOUS BLD VENIPUNCTURE: CPT

## 2021-01-21 PROCEDURE — 80061 LIPID PANEL: CPT

## 2021-01-25 DIAGNOSIS — Z23 NEED FOR VACCINATION: ICD-10-CM

## 2021-01-25 NOTE — PROGRESS NOTES
Future Appointments  1/26/2021  10:45 AM   Dorothea Penaloza MD     HOSP           [facility]  11/24/2021 8:00 AM    Rekha Garcia MD          [facility] [address]        [address] [facility]

## 2021-01-28 ENCOUNTER — TELEPHONE (OUTPATIENT)
Dept: NEPHROLOGY | Facility: CLINIC | Age: 76
End: 2021-01-28

## 2021-01-28 NOTE — TELEPHONE ENCOUNTER
Pt's son called - pt of Dr. Lyndsay Rodriguez  CKD stg III/IV; baseline Cr as of recent labs ~ 2  Had angiogram @ [facility] and subsequently noted to have worsening edema/less urine.   +SOB    Advised son to bring pt to ER - here or [facility]    He will talk to his dad and bring

## 2021-02-06 ENCOUNTER — IMMUNIZATION (OUTPATIENT)
Dept: LAB | Age: 76
End: 2021-02-06
Attending: HOSPITALIST
Payer: MEDICARE

## 2021-02-06 DIAGNOSIS — Z23 NEED FOR VACCINATION: Primary | ICD-10-CM

## 2021-02-06 PROCEDURE — 0001A SARSCOV2 VAC 30MCG/0.3ML IM: CPT

## 2021-02-08 ENCOUNTER — LAB ENCOUNTER (OUTPATIENT)
Dept: LAB | Age: 76
End: 2021-02-08
Attending: INTERNAL MEDICINE
Payer: MEDICARE

## 2021-02-08 DIAGNOSIS — Z79.899 LONG-TERM USE OF HIGH-RISK MEDICATION: ICD-10-CM

## 2021-02-08 DIAGNOSIS — E55.9 VITAMIN D INSUFFICIENCY: ICD-10-CM

## 2021-02-08 DIAGNOSIS — C88.0 MACROGLOBULINEMIA (HCC): ICD-10-CM

## 2021-02-08 DIAGNOSIS — N18.30 CHRONIC KIDNEY DISEASE, STAGE III (MODERATE) (HCC): Primary | ICD-10-CM

## 2021-02-08 DIAGNOSIS — M35.3 POLYMYALGIA RHEUMATICA (HCC): ICD-10-CM

## 2021-02-08 DIAGNOSIS — I10 ESSENTIAL HYPERTENSION, MALIGNANT: ICD-10-CM

## 2021-02-08 DIAGNOSIS — E11.8 DIABETIC COMPLICATION (HCC): ICD-10-CM

## 2021-02-08 DIAGNOSIS — E87.1 HYPOSMOLALITY SYNDROME: ICD-10-CM

## 2021-02-08 DIAGNOSIS — C61 MALIGNANT NEOPLASM OF PROSTATE (HCC): ICD-10-CM

## 2021-02-08 LAB
ALBUMIN SERPL-MCNC: 2.7 G/DL (ref 3.4–5)
ALBUMIN/GLOB SERPL: 0.5 {RATIO} (ref 1–2)
ALP LIVER SERPL-CCNC: 98 U/L
ALT SERPL-CCNC: 45 U/L
ANION GAP SERPL CALC-SCNC: 7 MMOL/L (ref 0–18)
ANION GAP SERPL CALC-SCNC: 7 MMOL/L (ref 0–18)
AST SERPL-CCNC: 27 U/L (ref 15–37)
BASOPHILS # BLD AUTO: 0.03 X10(3) UL (ref 0–0.2)
BASOPHILS NFR BLD AUTO: 0.2 %
BILIRUB SERPL-MCNC: 0.5 MG/DL (ref 0.1–2)
BILIRUB UR QL STRIP.AUTO: NEGATIVE
BUN BLD-MCNC: 34 MG/DL (ref 7–18)
BUN BLD-MCNC: 36 MG/DL (ref 7–18)
BUN/CREAT SERPL: 16.1 (ref 10–20)
BUN/CREAT SERPL: 16.4 (ref 10–20)
CALCIUM BLD-MCNC: 8.3 MG/DL (ref 8.5–10.1)
CALCIUM BLD-MCNC: 8.5 MG/DL (ref 8.5–10.1)
CHLORIDE SERPL-SCNC: 101 MMOL/L (ref 98–112)
CHLORIDE SERPL-SCNC: 101 MMOL/L (ref 98–112)
CLARITY UR REFRACT.AUTO: CLEAR
CO2 SERPL-SCNC: 25 MMOL/L (ref 21–32)
CO2 SERPL-SCNC: 27 MMOL/L (ref 21–32)
COLOR UR AUTO: YELLOW
CREAT BLD-MCNC: 2.11 MG/DL
CREAT BLD-MCNC: 2.2 MG/DL
CREAT UR-SCNC: 94.5 MG/DL
CREAT UR-SCNC: 94.5 MG/DL
DEPRECATED RDW RBC AUTO: 45.1 FL (ref 35.1–46.3)
EOSINOPHIL # BLD AUTO: 0.19 X10(3) UL (ref 0–0.7)
EOSINOPHIL NFR BLD AUTO: 1.6 %
ERYTHROCYTE [DISTWIDTH] IN BLOOD BY AUTOMATED COUNT: 13.7 % (ref 11–15)
EST. AVERAGE GLUCOSE BLD GHB EST-MCNC: 206 MG/DL (ref 68–126)
GLOBULIN PLAS-MCNC: 5.6 G/DL (ref 2.8–4.4)
GLUCOSE BLD-MCNC: 227 MG/DL (ref 70–99)
GLUCOSE BLD-MCNC: 231 MG/DL (ref 70–99)
GLUCOSE UR STRIP.AUTO-MCNC: 50 MG/DL
HBA1C MFR BLD HPLC: 8.8 % (ref ?–5.7)
HCT VFR BLD AUTO: 33.2 %
HGB BLD-MCNC: 11.2 G/DL
HYALINE CASTS #/AREA URNS AUTO: PRESENT /LPF
IGA SERPL-MCNC: 173 MG/DL (ref 70–312)
IGM SERPL-MCNC: 2800 MG/DL (ref 43–279)
IMM GRANULOCYTES # BLD AUTO: 0.05 X10(3) UL (ref 0–1)
IMM GRANULOCYTES NFR BLD: 0.4 %
IMMUNOGLOBULIN PNL SER-MCNC: 534 MG/DL (ref 791–1643)
KETONES UR STRIP.AUTO-MCNC: NEGATIVE MG/DL
LEUKOCYTE ESTERASE UR QL STRIP.AUTO: NEGATIVE
LYMPHOCYTES # BLD AUTO: 4.62 X10(3) UL (ref 1–4)
LYMPHOCYTES NFR BLD AUTO: 37.8 %
M PROTEIN MFR SERPL ELPH: 8.3 G/DL (ref 6.4–8.2)
MCH RBC QN AUTO: 31 PG (ref 26–34)
MCHC RBC AUTO-ENTMCNC: 33.7 G/DL (ref 31–37)
MCV RBC AUTO: 92 FL
MICROALBUMIN UR-MCNC: 477 MG/DL
MICROALBUMIN/CREAT 24H UR-RTO: 5047.6 UG/MG (ref ?–30)
MONOCYTES # BLD AUTO: 0.69 X10(3) UL (ref 0.1–1)
MONOCYTES NFR BLD AUTO: 5.6 %
NEUTROPHILS # BLD AUTO: 6.65 X10 (3) UL (ref 1.5–7.7)
NEUTROPHILS # BLD AUTO: 6.65 X10(3) UL (ref 1.5–7.7)
NEUTROPHILS NFR BLD AUTO: 54.4 %
NITRITE UR QL STRIP.AUTO: NEGATIVE
OSMOLALITY SERPL CALC.SUM OF ELEC: 291 MOSM/KG (ref 275–295)
OSMOLALITY SERPL CALC.SUM OF ELEC: 295 MOSM/KG (ref 275–295)
PATIENT FASTING Y/N/NP: YES
PATIENT FASTING Y/N/NP: YES
PH UR STRIP.AUTO: 6 [PH] (ref 4.5–8)
PHOSPHATE SERPL-MCNC: 3.5 MG/DL (ref 2.5–4.9)
PLATELET # BLD AUTO: 314 10(3)UL (ref 150–450)
POTASSIUM SERPL-SCNC: 3.8 MMOL/L (ref 3.5–5.1)
POTASSIUM SERPL-SCNC: 3.9 MMOL/L (ref 3.5–5.1)
PROT UR STRIP.AUTO-MCNC: >=500 MG/DL
PROT UR-MCNC: 490.5 MG/DL
PTH-INTACT SERPL-MCNC: 126.9 PG/ML (ref 18.5–88)
RBC # BLD AUTO: 3.61 X10(6)UL
RBC UR QL AUTO: NEGATIVE
SODIUM SERPL-SCNC: 133 MMOL/L (ref 136–145)
SODIUM SERPL-SCNC: 135 MMOL/L (ref 136–145)
SP GR UR STRIP.AUTO: 1.01 (ref 1–1.03)
URATE SERPL-MCNC: 6.7 MG/DL
UROBILINOGEN UR STRIP.AUTO-MCNC: <2 MG/DL
VIT D+METAB SERPL-MCNC: 27.3 NG/ML (ref 30–100)
WBC # BLD AUTO: 12.2 X10(3) UL (ref 4–11)

## 2021-02-08 PROCEDURE — 82570 ASSAY OF URINE CREATININE: CPT

## 2021-02-08 PROCEDURE — 83036 HEMOGLOBIN GLYCOSYLATED A1C: CPT

## 2021-02-08 PROCEDURE — 81001 URINALYSIS AUTO W/SCOPE: CPT

## 2021-02-08 PROCEDURE — 80053 COMPREHEN METABOLIC PANEL: CPT

## 2021-02-08 PROCEDURE — 82784 ASSAY IGA/IGD/IGG/IGM EACH: CPT

## 2021-02-08 PROCEDURE — 84100 ASSAY OF PHOSPHORUS: CPT

## 2021-02-08 PROCEDURE — 85025 COMPLETE CBC W/AUTO DIFF WBC: CPT

## 2021-02-08 PROCEDURE — 82306 VITAMIN D 25 HYDROXY: CPT

## 2021-02-08 PROCEDURE — 84156 ASSAY OF PROTEIN URINE: CPT

## 2021-02-08 PROCEDURE — 83970 ASSAY OF PARATHORMONE: CPT

## 2021-02-08 PROCEDURE — 36415 COLL VENOUS BLD VENIPUNCTURE: CPT

## 2021-02-08 PROCEDURE — 82043 UR ALBUMIN QUANTITATIVE: CPT

## 2021-02-08 PROCEDURE — 80048 BASIC METABOLIC PNL TOTAL CA: CPT

## 2021-02-08 PROCEDURE — 84550 ASSAY OF BLOOD/URIC ACID: CPT

## 2021-02-24 ENCOUNTER — LAB ENCOUNTER (OUTPATIENT)
Dept: LAB | Age: 76
End: 2021-02-24
Attending: INTERNAL MEDICINE
Payer: MEDICARE

## 2021-02-24 DIAGNOSIS — N18.30 CHRONIC KIDNEY DISEASE, STAGE III (MODERATE) (HCC): Primary | ICD-10-CM

## 2021-02-24 DIAGNOSIS — C61 MALIGNANT NEOPLASM OF PROSTATE (HCC): ICD-10-CM

## 2021-02-24 DIAGNOSIS — C88.0 MACROGLOBULINEMIA (HCC): ICD-10-CM

## 2021-02-24 DIAGNOSIS — I10 ESSENTIAL HYPERTENSION, MALIGNANT: ICD-10-CM

## 2021-02-24 DIAGNOSIS — E11.8 DIABETIC COMPLICATION (HCC): ICD-10-CM

## 2021-02-24 DIAGNOSIS — M35.3 POLYMYALGIA RHEUMATICA (HCC): ICD-10-CM

## 2021-02-24 DIAGNOSIS — E55.9 AVITAMINOSIS D: ICD-10-CM

## 2021-02-24 LAB
ALBUMIN SERPL-MCNC: 2.4 G/DL (ref 3.4–5)
ANION GAP SERPL CALC-SCNC: 8 MMOL/L (ref 0–18)
BUN BLD-MCNC: 39 MG/DL (ref 7–18)
BUN/CREAT SERPL: 18.8 (ref 10–20)
CALCIUM BLD-MCNC: 8.7 MG/DL (ref 8.5–10.1)
CHLORIDE SERPL-SCNC: 104 MMOL/L (ref 98–112)
CO2 SERPL-SCNC: 27 MMOL/L (ref 21–32)
CREAT BLD-MCNC: 2.07 MG/DL
GLUCOSE BLD-MCNC: 301 MG/DL (ref 70–99)
OSMOLALITY SERPL CALC.SUM OF ELEC: 309 MOSM/KG (ref 275–295)
PATIENT FASTING Y/N/NP: NO
PHOSPHATE SERPL-MCNC: 3.5 MG/DL (ref 2.5–4.9)
POTASSIUM SERPL-SCNC: 3.6 MMOL/L (ref 3.5–5.1)
SODIUM SERPL-SCNC: 139 MMOL/L (ref 136–145)

## 2021-02-24 PROCEDURE — 80048 BASIC METABOLIC PNL TOTAL CA: CPT

## 2021-02-24 PROCEDURE — 84100 ASSAY OF PHOSPHORUS: CPT

## 2021-02-24 PROCEDURE — 36415 COLL VENOUS BLD VENIPUNCTURE: CPT

## 2021-02-24 PROCEDURE — 82040 ASSAY OF SERUM ALBUMIN: CPT

## 2021-02-27 ENCOUNTER — IMMUNIZATION (OUTPATIENT)
Dept: LAB | Age: 76
End: 2021-02-27
Attending: HOSPITALIST
Payer: MEDICARE

## 2021-02-27 DIAGNOSIS — Z23 NEED FOR VACCINATION: Primary | ICD-10-CM

## 2021-02-27 PROCEDURE — 0002A SARSCOV2 VAC 30MCG/0.3ML IM: CPT

## 2021-03-15 ENCOUNTER — LAB ENCOUNTER (OUTPATIENT)
Dept: LAB | Age: 76
End: 2021-03-15
Attending: INTERNAL MEDICINE
Payer: MEDICARE

## 2021-03-15 DIAGNOSIS — M79.602 LEFT ARM PAIN: ICD-10-CM

## 2021-03-15 DIAGNOSIS — I50.32 CHRONIC DIASTOLIC CHF (CONGESTIVE HEART FAILURE) (HCC): ICD-10-CM

## 2021-03-15 DIAGNOSIS — N18.4 CKD STAGE 4 DUE TO TYPE 2 DIABETES MELLITUS (HCC): ICD-10-CM

## 2021-03-15 DIAGNOSIS — I35.0 NONRHEUMATIC AORTIC VALVE STENOSIS: ICD-10-CM

## 2021-03-15 DIAGNOSIS — E11.22 CKD STAGE 4 DUE TO TYPE 2 DIABETES MELLITUS (HCC): ICD-10-CM

## 2021-03-15 DIAGNOSIS — I10 ESSENTIAL HYPERTENSION: ICD-10-CM

## 2021-03-15 DIAGNOSIS — E78.5 HYPERLIPIDEMIA LDL GOAL <70: ICD-10-CM

## 2021-03-15 DIAGNOSIS — I34.0 NONRHEUMATIC MITRAL VALVE REGURGITATION: ICD-10-CM

## 2021-03-15 LAB
BASOPHILS # BLD AUTO: 0.03 X10(3) UL (ref 0–0.2)
BASOPHILS NFR BLD AUTO: 0.3 %
BILIRUB UR QL STRIP.AUTO: NEGATIVE
CLARITY UR REFRACT.AUTO: CLEAR
COLOR UR AUTO: YELLOW
CREAT UR-SCNC: 109 MG/DL
DEPRECATED RDW RBC AUTO: 46.7 FL (ref 35.1–46.3)
EOSINOPHIL # BLD AUTO: 0.18 X10(3) UL (ref 0–0.7)
EOSINOPHIL NFR BLD AUTO: 1.8 %
ERYTHROCYTE [DISTWIDTH] IN BLOOD BY AUTOMATED COUNT: 13.5 % (ref 11–15)
EST. AVERAGE GLUCOSE BLD GHB EST-MCNC: 192 MG/DL (ref 68–126)
GLUCOSE UR STRIP.AUTO-MCNC: 50 MG/DL
HBA1C MFR BLD HPLC: 8.3 % (ref ?–5.7)
HCT VFR BLD AUTO: 33.5 %
HGB BLD-MCNC: 10.8 G/DL
IGA SERPL-MCNC: 186 MG/DL (ref 70–312)
IGM SERPL-MCNC: 2580 MG/DL (ref 43–279)
IMM GRANULOCYTES # BLD AUTO: 0.02 X10(3) UL (ref 0–1)
IMM GRANULOCYTES NFR BLD: 0.2 %
IMMUNOGLOBULIN PNL SER-MCNC: 539 MG/DL (ref 791–1643)
KETONES UR STRIP.AUTO-MCNC: NEGATIVE MG/DL
LEUKOCYTE ESTERASE UR QL STRIP.AUTO: NEGATIVE
LYMPHOCYTES # BLD AUTO: 3.28 X10(3) UL (ref 1–4)
LYMPHOCYTES NFR BLD AUTO: 33.4 %
MCH RBC QN AUTO: 30.5 PG (ref 26–34)
MCHC RBC AUTO-ENTMCNC: 32.2 G/DL (ref 31–37)
MCV RBC AUTO: 94.6 FL
MONOCYTES # BLD AUTO: 0.53 X10(3) UL (ref 0.1–1)
MONOCYTES NFR BLD AUTO: 5.4 %
NEUTROPHILS # BLD AUTO: 5.78 X10 (3) UL (ref 1.5–7.7)
NEUTROPHILS # BLD AUTO: 5.78 X10(3) UL (ref 1.5–7.7)
NEUTROPHILS NFR BLD AUTO: 58.9 %
NITRITE UR QL STRIP.AUTO: NEGATIVE
PH UR STRIP.AUTO: 6 [PH] (ref 5–8)
PHOSPHATE SERPL-MCNC: 3.7 MG/DL (ref 2.5–4.9)
PLATELET # BLD AUTO: 219 10(3)UL (ref 150–450)
PROT UR STRIP.AUTO-MCNC: >=500 MG/DL
PROT UR-MCNC: 520.6 MG/DL
PROT/CREAT UR-RTO: 4.78
PTH-INTACT SERPL-MCNC: 83.9 PG/ML (ref 18.5–88)
RBC # BLD AUTO: 3.54 X10(6)UL
RBC UR QL AUTO: NEGATIVE
SP GR UR STRIP.AUTO: 1.01 (ref 1–1.03)
URATE SERPL-MCNC: 7 MG/DL
UROBILINOGEN UR STRIP.AUTO-MCNC: <2 MG/DL
VIT D+METAB SERPL-MCNC: 31 NG/ML (ref 30–100)
WBC # BLD AUTO: 9.8 X10(3) UL (ref 4–11)

## 2021-03-15 PROCEDURE — 82784 ASSAY IGA/IGD/IGG/IGM EACH: CPT

## 2021-03-15 PROCEDURE — 83036 HEMOGLOBIN GLYCOSYLATED A1C: CPT

## 2021-03-15 PROCEDURE — 82570 ASSAY OF URINE CREATININE: CPT

## 2021-03-15 PROCEDURE — 82306 VITAMIN D 25 HYDROXY: CPT

## 2021-03-15 PROCEDURE — 81001 URINALYSIS AUTO W/SCOPE: CPT

## 2021-03-15 PROCEDURE — 84550 ASSAY OF BLOOD/URIC ACID: CPT

## 2021-03-15 PROCEDURE — 36415 COLL VENOUS BLD VENIPUNCTURE: CPT

## 2021-03-15 PROCEDURE — 84100 ASSAY OF PHOSPHORUS: CPT

## 2021-03-15 PROCEDURE — 80053 COMPREHEN METABOLIC PANEL: CPT

## 2021-03-15 PROCEDURE — 83970 ASSAY OF PARATHORMONE: CPT

## 2021-03-15 PROCEDURE — 84156 ASSAY OF PROTEIN URINE: CPT

## 2021-03-15 PROCEDURE — 85025 COMPLETE CBC W/AUTO DIFF WBC: CPT

## 2021-03-16 LAB
ALBUMIN SERPL-MCNC: 2.3 G/DL (ref 3.4–5)
ALBUMIN/GLOB SERPL: 0.4 {RATIO} (ref 1–2)
ALP LIVER SERPL-CCNC: 85 U/L
ALT SERPL-CCNC: 23 U/L
ANION GAP SERPL CALC-SCNC: 7 MMOL/L (ref 0–18)
AST SERPL-CCNC: 20 U/L (ref 15–37)
BILIRUB SERPL-MCNC: 0.4 MG/DL (ref 0.1–2)
BUN BLD-MCNC: 33 MG/DL (ref 7–18)
BUN/CREAT SERPL: 16.4 (ref 10–20)
CALCIUM BLD-MCNC: 8.6 MG/DL (ref 8.5–10.1)
CHLORIDE SERPL-SCNC: 104 MMOL/L (ref 98–112)
CO2 SERPL-SCNC: 28 MMOL/L (ref 21–32)
CREAT BLD-MCNC: 2.01 MG/DL
GLOBULIN PLAS-MCNC: 5.2 G/DL (ref 2.8–4.4)
GLUCOSE BLD-MCNC: 198 MG/DL (ref 70–99)
M PROTEIN MFR SERPL ELPH: 7.5 G/DL (ref 6.4–8.2)
OSMOLALITY SERPL CALC.SUM OF ELEC: 301 MOSM/KG (ref 275–295)
POTASSIUM SERPL-SCNC: 3.4 MMOL/L (ref 3.5–5.1)
SODIUM SERPL-SCNC: 139 MMOL/L (ref 136–145)

## 2021-05-03 ENCOUNTER — LAB ENCOUNTER (OUTPATIENT)
Dept: LAB | Age: 76
End: 2021-05-03
Attending: INTERNAL MEDICINE
Payer: MEDICARE

## 2021-05-03 ENCOUNTER — MOBILE ENCOUNTER (OUTPATIENT)
Dept: NEPHROLOGY | Facility: CLINIC | Age: 76
End: 2021-05-03

## 2021-05-03 DIAGNOSIS — C61 MALIGNANT NEOPLASM OF PROSTATE (HCC): ICD-10-CM

## 2021-05-03 DIAGNOSIS — E87.1 HYPOSMOLALITY SYNDROME: ICD-10-CM

## 2021-05-03 DIAGNOSIS — I10 ESSENTIAL HYPERTENSION, MALIGNANT: ICD-10-CM

## 2021-05-03 DIAGNOSIS — C88.0 MACROGLOBULINEMIA (HCC): ICD-10-CM

## 2021-05-03 DIAGNOSIS — M35.3 POLYMYALGIA RHEUMATICA (HCC): ICD-10-CM

## 2021-05-03 DIAGNOSIS — E55.9 AVITAMINOSIS D: ICD-10-CM

## 2021-05-03 DIAGNOSIS — E11.8 DIABETIC COMPLICATION (HCC): ICD-10-CM

## 2021-05-03 DIAGNOSIS — N18.30 CHRONIC KIDNEY DISEASE, STAGE III (MODERATE) (HCC): Primary | ICD-10-CM

## 2021-05-03 PROCEDURE — 82043 UR ALBUMIN QUANTITATIVE: CPT

## 2021-05-03 PROCEDURE — 84100 ASSAY OF PHOSPHORUS: CPT

## 2021-05-03 PROCEDURE — 36415 COLL VENOUS BLD VENIPUNCTURE: CPT

## 2021-05-03 PROCEDURE — 80053 COMPREHEN METABOLIC PANEL: CPT

## 2021-05-03 PROCEDURE — 83735 ASSAY OF MAGNESIUM: CPT

## 2021-05-03 PROCEDURE — 82570 ASSAY OF URINE CREATININE: CPT

## 2021-05-03 PROCEDURE — 81001 URINALYSIS AUTO W/SCOPE: CPT

## 2021-05-03 PROCEDURE — 85025 COMPLETE CBC W/AUTO DIFF WBC: CPT

## 2021-05-03 PROCEDURE — 84156 ASSAY OF PROTEIN URINE: CPT

## 2021-05-03 PROCEDURE — 84550 ASSAY OF BLOOD/URIC ACID: CPT

## 2021-05-12 ENCOUNTER — LAB ENCOUNTER (OUTPATIENT)
Dept: LAB | Age: 76
End: 2021-05-12
Attending: INTERNAL MEDICINE
Payer: MEDICARE

## 2021-05-12 DIAGNOSIS — I10 ESSENTIAL HYPERTENSION, MALIGNANT: ICD-10-CM

## 2021-05-12 DIAGNOSIS — C88.0 MACROGLOBULINEMIA (HCC): ICD-10-CM

## 2021-05-12 DIAGNOSIS — N18.30 CHRONIC KIDNEY DISEASE, STAGE III (MODERATE) (HCC): Primary | ICD-10-CM

## 2021-05-12 DIAGNOSIS — M35.3 POLYMYALGIA RHEUMATICA (HCC): ICD-10-CM

## 2021-05-12 DIAGNOSIS — E87.1 HYPOSMOLALITY SYNDROME: ICD-10-CM

## 2021-05-12 DIAGNOSIS — E55.9 AVITAMINOSIS D: ICD-10-CM

## 2021-05-12 PROCEDURE — 36415 COLL VENOUS BLD VENIPUNCTURE: CPT

## 2021-05-12 PROCEDURE — 80048 BASIC METABOLIC PNL TOTAL CA: CPT

## 2021-05-12 PROCEDURE — 84100 ASSAY OF PHOSPHORUS: CPT

## 2021-05-12 PROCEDURE — 82040 ASSAY OF SERUM ALBUMIN: CPT

## 2021-06-08 ENCOUNTER — LAB ENCOUNTER (OUTPATIENT)
Dept: LAB | Age: 76
End: 2021-06-08
Attending: INTERNAL MEDICINE
Payer: MEDICARE

## 2021-06-08 DIAGNOSIS — C61 MALIGNANT NEOPLASM OF PROSTATE (HCC): ICD-10-CM

## 2021-06-08 DIAGNOSIS — E55.9 VITAMIN D DEFICIENCY: ICD-10-CM

## 2021-06-08 DIAGNOSIS — C88.0 MACROGLOBULINEMIA (HCC): ICD-10-CM

## 2021-06-08 DIAGNOSIS — E87.1 HYPOSMOLALITY SYNDROME: ICD-10-CM

## 2021-06-08 DIAGNOSIS — N18.30 CHRONIC KIDNEY DISEASE, STAGE III (MODERATE) (HCC): Primary | ICD-10-CM

## 2021-06-08 DIAGNOSIS — M35.3 POLYMYALGIA RHEUMATICA (HCC): ICD-10-CM

## 2021-06-08 DIAGNOSIS — I10 ESSENTIAL HYPERTENSION, MALIGNANT: ICD-10-CM

## 2021-06-08 PROCEDURE — 84100 ASSAY OF PHOSPHORUS: CPT

## 2021-06-08 PROCEDURE — 80053 COMPREHEN METABOLIC PANEL: CPT

## 2021-06-08 PROCEDURE — 36415 COLL VENOUS BLD VENIPUNCTURE: CPT

## 2021-07-26 ENCOUNTER — LAB ENCOUNTER (OUTPATIENT)
Dept: LAB | Age: 76
End: 2021-07-26
Attending: INTERNAL MEDICINE
Payer: MEDICARE

## 2021-07-26 DIAGNOSIS — I10 ESSENTIAL HYPERTENSION, MALIGNANT: ICD-10-CM

## 2021-07-26 DIAGNOSIS — C88.0 MACROGLOBULINEMIA (HCC): ICD-10-CM

## 2021-07-26 DIAGNOSIS — N18.30 CHRONIC KIDNEY DISEASE, STAGE III (MODERATE) (HCC): Primary | ICD-10-CM

## 2021-07-26 DIAGNOSIS — E11.8 DIABETIC COMPLICATION (HCC): ICD-10-CM

## 2021-07-26 DIAGNOSIS — E55.9 VITAMIN D DEFICIENCY: ICD-10-CM

## 2021-07-26 DIAGNOSIS — C61 MALIGNANT NEOPLASM OF PROSTATE (HCC): ICD-10-CM

## 2021-07-26 DIAGNOSIS — E87.1 HYPOSMOLALITY SYNDROME: ICD-10-CM

## 2021-07-26 DIAGNOSIS — M35.3 POLYMYALGIA RHEUMATICA (HCC): ICD-10-CM

## 2021-07-26 LAB
ANION GAP SERPL CALC-SCNC: 8 MMOL/L (ref 0–18)
BUN BLD-MCNC: 52 MG/DL (ref 7–18)
BUN/CREAT SERPL: 15.7 (ref 10–20)
CALCIUM BLD-MCNC: 8.3 MG/DL (ref 8.5–10.1)
CHLORIDE SERPL-SCNC: 106 MMOL/L (ref 98–112)
CO2 SERPL-SCNC: 25 MMOL/L (ref 21–32)
CREAT BLD-MCNC: 3.32 MG/DL
GLUCOSE BLD-MCNC: 210 MG/DL (ref 70–99)
OSMOLALITY SERPL CALC.SUM OF ELEC: 308 MOSM/KG (ref 275–295)
PATIENT FASTING Y/N/NP: YES
PHOSPHATE SERPL-MCNC: 3.9 MG/DL (ref 2.5–4.9)
POTASSIUM SERPL-SCNC: 3.4 MMOL/L (ref 3.5–5.1)
SODIUM SERPL-SCNC: 139 MMOL/L (ref 136–145)

## 2021-07-26 PROCEDURE — 36415 COLL VENOUS BLD VENIPUNCTURE: CPT

## 2021-07-26 PROCEDURE — 84100 ASSAY OF PHOSPHORUS: CPT

## 2021-07-26 PROCEDURE — 80048 BASIC METABOLIC PNL TOTAL CA: CPT

## 2021-08-03 ENCOUNTER — LAB ENCOUNTER (OUTPATIENT)
Dept: LAB | Age: 76
End: 2021-08-03
Attending: INTERNAL MEDICINE
Payer: MEDICARE

## 2021-08-03 DIAGNOSIS — N18.30 CHRONIC KIDNEY DISEASE, STAGE III (MODERATE) (HCC): Primary | ICD-10-CM

## 2021-08-03 DIAGNOSIS — E87.1 HYPOSMOLALITY SYNDROME: ICD-10-CM

## 2021-08-03 DIAGNOSIS — C88.0 MACROGLOBULINEMIA (HCC): ICD-10-CM

## 2021-08-03 DIAGNOSIS — I10 ESSENTIAL HYPERTENSION, MALIGNANT: ICD-10-CM

## 2021-08-03 DIAGNOSIS — E55.9 AVITAMINOSIS D: ICD-10-CM

## 2021-08-03 DIAGNOSIS — E11.8 DIABETIC COMPLICATION (HCC): ICD-10-CM

## 2021-08-03 DIAGNOSIS — C61 MALIGNANT NEOPLASM OF PROSTATE (HCC): ICD-10-CM

## 2021-08-03 DIAGNOSIS — M35.3 POLYMYALGIA RHEUMATICA (HCC): ICD-10-CM

## 2021-08-03 LAB
ALBUMIN SERPL-MCNC: 2 G/DL (ref 3.4–5)
ALBUMIN/GLOB SERPL: 0.4 {RATIO} (ref 1–2)
ALP LIVER SERPL-CCNC: 99 U/L
ALT SERPL-CCNC: 21 U/L
ANION GAP SERPL CALC-SCNC: 7 MMOL/L (ref 0–18)
AST SERPL-CCNC: 16 U/L (ref 15–37)
BASOPHILS # BLD AUTO: 0.02 X10(3) UL (ref 0–0.2)
BASOPHILS NFR BLD AUTO: 0.2 %
BILIRUB SERPL-MCNC: 0.4 MG/DL (ref 0.1–2)
BILIRUB UR QL STRIP.AUTO: NEGATIVE
BUN BLD-MCNC: 54 MG/DL (ref 7–18)
CALCIUM BLD-MCNC: 8 MG/DL (ref 8.5–10.1)
CHLORIDE SERPL-SCNC: 105 MMOL/L (ref 98–112)
CLARITY UR REFRACT.AUTO: CLEAR
CO2 SERPL-SCNC: 25 MMOL/L (ref 21–32)
COLOR UR AUTO: YELLOW
CREAT BLD-MCNC: 3.7 MG/DL
CREAT UR-SCNC: 60 MG/DL
EOSINOPHIL # BLD AUTO: 0.2 X10(3) UL (ref 0–0.7)
EOSINOPHIL NFR BLD AUTO: 1.9 %
ERYTHROCYTE [DISTWIDTH] IN BLOOD BY AUTOMATED COUNT: 14.7 %
GLOBULIN PLAS-MCNC: 5.3 G/DL (ref 2.8–4.4)
GLUCOSE BLD-MCNC: 237 MG/DL (ref 70–99)
GLUCOSE UR STRIP.AUTO-MCNC: 50 MG/DL
HCT VFR BLD AUTO: 28.3 %
HGB BLD-MCNC: 9.2 G/DL
HYALINE CASTS #/AREA URNS AUTO: PRESENT /LPF
IMM GRANULOCYTES # BLD AUTO: 0.03 X10(3) UL (ref 0–1)
IMM GRANULOCYTES NFR BLD: 0.3 %
KETONES UR STRIP.AUTO-MCNC: NEGATIVE MG/DL
LEUKOCYTE ESTERASE UR QL STRIP.AUTO: NEGATIVE
LYMPHOCYTES # BLD AUTO: 3.31 X10(3) UL (ref 1–4)
LYMPHOCYTES NFR BLD AUTO: 31.5 %
M PROTEIN MFR SERPL ELPH: 7.3 G/DL (ref 6.4–8.2)
MCH RBC QN AUTO: 28.7 PG (ref 26–34)
MCHC RBC AUTO-ENTMCNC: 32.5 G/DL (ref 31–37)
MCV RBC AUTO: 88.2 FL
MONOCYTES # BLD AUTO: 0.68 X10(3) UL (ref 0.1–1)
MONOCYTES NFR BLD AUTO: 6.5 %
NEUTROPHILS # BLD AUTO: 6.27 X10 (3) UL (ref 1.5–7.7)
NEUTROPHILS # BLD AUTO: 6.27 X10(3) UL (ref 1.5–7.7)
NEUTROPHILS NFR BLD AUTO: 59.6 %
NITRITE UR QL STRIP.AUTO: NEGATIVE
OSMOLALITY SERPL CALC.SUM OF ELEC: 306 MOSM/KG (ref 275–295)
PATIENT FASTING Y/N/NP: YES
PH UR STRIP.AUTO: 6 [PH] (ref 5–8)
PHOSPHATE SERPL-MCNC: 3.7 MG/DL (ref 2.5–4.9)
PLATELET # BLD AUTO: 254 10(3)UL (ref 150–450)
POTASSIUM SERPL-SCNC: 3.2 MMOL/L (ref 3.5–5.1)
PROT UR STRIP.AUTO-MCNC: >=500 MG/DL
PROT UR-MCNC: 318.8 MG/DL
RBC # BLD AUTO: 3.21 X10(6)UL
RBC UR QL AUTO: NEGATIVE
SODIUM SERPL-SCNC: 137 MMOL/L (ref 136–145)
SP GR UR STRIP.AUTO: 1.01 (ref 1–1.03)
UROBILINOGEN UR STRIP.AUTO-MCNC: <2 MG/DL
WBC # BLD AUTO: 10.5 X10(3) UL (ref 4–11)

## 2021-08-03 PROCEDURE — 36415 COLL VENOUS BLD VENIPUNCTURE: CPT

## 2021-08-03 PROCEDURE — 84156 ASSAY OF PROTEIN URINE: CPT

## 2021-08-03 PROCEDURE — 82570 ASSAY OF URINE CREATININE: CPT

## 2021-08-03 PROCEDURE — 81001 URINALYSIS AUTO W/SCOPE: CPT

## 2021-08-03 PROCEDURE — 84100 ASSAY OF PHOSPHORUS: CPT

## 2021-08-03 PROCEDURE — 80053 COMPREHEN METABOLIC PANEL: CPT

## 2021-08-03 PROCEDURE — 85025 COMPLETE CBC W/AUTO DIFF WBC: CPT

## 2021-08-05 ENCOUNTER — HOSPITAL ENCOUNTER (OUTPATIENT)
Facility: HOSPITAL | Age: 76
Setting detail: OBSERVATION
Discharge: HOME | End: 2021-08-07
Attending: EMERGENCY MEDICINE | Admitting: HOSPITALIST
Payer: MEDICARE

## 2021-08-05 ENCOUNTER — APPOINTMENT (OUTPATIENT)
Dept: GENERAL RADIOLOGY | Facility: HOSPITAL | Age: 76
End: 2021-08-05
Attending: EMERGENCY MEDICINE
Payer: MEDICARE

## 2021-08-05 DIAGNOSIS — R55 SYNCOPE AND COLLAPSE: ICD-10-CM

## 2021-08-05 DIAGNOSIS — E87.6 HYPOKALEMIA: Primary | ICD-10-CM

## 2021-08-05 DIAGNOSIS — R19.7 DIARRHEA, UNSPECIFIED TYPE: ICD-10-CM

## 2021-08-05 PROBLEM — N17.9 ACUTE KIDNEY INJURY (HCC): Status: ACTIVE | Noted: 2021-08-05

## 2021-08-05 PROBLEM — R73.9 HYPERGLYCEMIA: Status: ACTIVE | Noted: 2021-08-05

## 2021-08-05 PROBLEM — D64.9 ANEMIA: Status: ACTIVE | Noted: 2021-08-05

## 2021-08-05 PROBLEM — N17.9 ACUTE RENAL FAILURE (ARF) (HCC): Status: ACTIVE | Noted: 2021-08-05

## 2021-08-05 PROBLEM — N18.4 CKD (CHRONIC KIDNEY DISEASE) STAGE 4, GFR 15-29 ML/MIN (HCC): Status: ACTIVE | Noted: 2019-03-10

## 2021-08-05 LAB
ALBUMIN SERPL-MCNC: 1.9 G/DL (ref 3.4–5)
ALBUMIN/GLOB SERPL: 0.4 {RATIO} (ref 1–2)
ALP LIVER SERPL-CCNC: 97 U/L
ALT SERPL-CCNC: 14 U/L
ANION GAP SERPL CALC-SCNC: 8 MMOL/L (ref 0–18)
ANTIBODY SCREEN: NEGATIVE
APTT PPP: 30.9 SECONDS (ref 25.4–36.1)
AST SERPL-CCNC: 10 U/L (ref 15–37)
ATRIAL RATE: 70 BPM
BASOPHILS # BLD AUTO: 0.02 X10(3) UL (ref 0–0.2)
BASOPHILS NFR BLD AUTO: 0.2 %
BILIRUB SERPL-MCNC: 0.4 MG/DL (ref 0.1–2)
BILIRUB UR QL STRIP.AUTO: NEGATIVE
BUN BLD-MCNC: 53 MG/DL (ref 7–18)
C DIFF TOX B STL QL: POSITIVE
CALCIUM BLD-MCNC: 7.7 MG/DL (ref 8.5–10.1)
CHLORIDE SERPL-SCNC: 107 MMOL/L (ref 98–112)
CLARITY UR REFRACT.AUTO: CLEAR
CO2 SERPL-SCNC: 22 MMOL/L (ref 21–32)
COLOR UR AUTO: YELLOW
CREAT BLD-MCNC: 3.76 MG/DL
CREAT UR-SCNC: 62.9 MG/DL
EOSINOPHIL # BLD AUTO: 0.15 X10(3) UL (ref 0–0.7)
EOSINOPHIL NFR BLD AUTO: 1.4 %
ERYTHROCYTE [DISTWIDTH] IN BLOOD BY AUTOMATED COUNT: 14.6 %
EST. AVERAGE GLUCOSE BLD GHB EST-MCNC: 212 MG/DL (ref 68–126)
GLOBULIN PLAS-MCNC: 5 G/DL (ref 2.8–4.4)
GLUCOSE BLD-MCNC: 233 MG/DL (ref 70–99)
GLUCOSE BLD-MCNC: 277 MG/DL (ref 70–99)
GLUCOSE UR STRIP.AUTO-MCNC: 50 MG/DL
HAV IGM SER QL: 1.8 MG/DL (ref 1.6–2.6)
HBA1C MFR BLD HPLC: 9 % (ref ?–5.7)
HCT VFR BLD AUTO: 27.2 %
HGB BLD-MCNC: 9.2 G/DL
HYALINE CASTS #/AREA URNS AUTO: PRESENT /LPF
IMM GRANULOCYTES # BLD AUTO: 0.03 X10(3) UL (ref 0–1)
IMM GRANULOCYTES NFR BLD: 0.3 %
INR BLD: 1.08 (ref 0.89–1.11)
KETONES UR STRIP.AUTO-MCNC: NEGATIVE MG/DL
LEUKOCYTE ESTERASE UR QL STRIP.AUTO: NEGATIVE
LYMPHOCYTES # BLD AUTO: 2 X10(3) UL (ref 1–4)
LYMPHOCYTES NFR BLD AUTO: 18.3 %
M PROTEIN MFR SERPL ELPH: 6.9 G/DL (ref 6.4–8.2)
MCH RBC QN AUTO: 28.9 PG (ref 26–34)
MCHC RBC AUTO-ENTMCNC: 33.8 G/DL (ref 31–37)
MCV RBC AUTO: 85.5 FL
MONOCYTES # BLD AUTO: 0.66 X10(3) UL (ref 0.1–1)
MONOCYTES NFR BLD AUTO: 6.1 %
NEUTROPHILS # BLD AUTO: 8.04 X10 (3) UL (ref 1.5–7.7)
NEUTROPHILS # BLD AUTO: 8.04 X10(3) UL (ref 1.5–7.7)
NEUTROPHILS NFR BLD AUTO: 73.7 %
NITRITE UR QL STRIP.AUTO: NEGATIVE
OSMOLALITY SERPL CALC.SUM OF ELEC: 306 MOSM/KG (ref 275–295)
P AXIS: 20 DEGREES
P-R INTERVAL: 164 MS
PH UR STRIP.AUTO: 5 [PH] (ref 5–8)
PLATELET # BLD AUTO: 251 10(3)UL (ref 150–450)
POTASSIUM SERPL-SCNC: 2.7 MMOL/L (ref 3.5–5.1)
PROT UR STRIP.AUTO-MCNC: >=500 MG/DL
PROT UR-MCNC: 337.2 MG/DL
PROT/CREAT UR-RTO: 5.36
PSA SERPL DL<=0.01 NG/ML-MCNC: 14.2 SECONDS (ref 12.2–14.5)
Q-T INTERVAL: 468 MS
QRS DURATION: 144 MS
QTC CALCULATION (BEZET): 505 MS
R AXIS: -68 DEGREES
RBC # BLD AUTO: 3.18 X10(6)UL
RBC UR QL AUTO: NEGATIVE
RH BLOOD TYPE: POSITIVE
SARS-COV-2 RNA RESP QL NAA+PROBE: NOT DETECTED
SODIUM SERPL-SCNC: 137 MMOL/L (ref 136–145)
SODIUM SERPL-SCNC: 34 MMOL/L
SP GR UR STRIP.AUTO: 1.01 (ref 1–1.03)
T AXIS: 19 DEGREES
TROPONIN I SERPL-MCNC: <0.045 NG/ML (ref ?–0.04)
UROBILINOGEN UR STRIP.AUTO-MCNC: <2 MG/DL
VENTRICULAR RATE: 70 BPM
WBC # BLD AUTO: 10.9 X10(3) UL (ref 4–11)

## 2021-08-05 PROCEDURE — 99223 1ST HOSP IP/OBS HIGH 75: CPT | Performed by: INTERNAL MEDICINE

## 2021-08-05 PROCEDURE — 99220 INITIAL OBSERVATION CARE,LEVL III: CPT | Performed by: STUDENT IN AN ORGANIZED HEALTH CARE EDUCATION/TRAINING PROGRAM

## 2021-08-05 PROCEDURE — 71045 X-RAY EXAM CHEST 1 VIEW: CPT | Performed by: EMERGENCY MEDICINE

## 2021-08-05 RX ORDER — ALLOPURINOL 100 MG/1
100 TABLET ORAL
Status: DISCONTINUED | OUTPATIENT
Start: 2021-08-05 | End: 2021-08-07

## 2021-08-05 RX ORDER — POTASSIUM CHLORIDE 20 MEQ/1
40 TABLET, EXTENDED RELEASE ORAL ONCE
Status: COMPLETED | OUTPATIENT
Start: 2021-08-05 | End: 2021-08-05

## 2021-08-05 RX ORDER — SODIUM CHLORIDE 9 MG/ML
INJECTION, SOLUTION INTRAVENOUS CONTINUOUS
Status: DISCONTINUED | OUTPATIENT
Start: 2021-08-05 | End: 2021-08-06

## 2021-08-05 RX ORDER — ACETAMINOPHEN 325 MG/1
650 TABLET ORAL EVERY 6 HOURS PRN
Status: DISCONTINUED | OUTPATIENT
Start: 2021-08-05 | End: 2021-08-07

## 2021-08-05 RX ORDER — LABETALOL HYDROCHLORIDE 5 MG/ML
10 INJECTION, SOLUTION INTRAVENOUS ONCE
Status: COMPLETED | OUTPATIENT
Start: 2021-08-05 | End: 2021-08-06

## 2021-08-05 RX ORDER — LOSARTAN POTASSIUM 25 MG/1
TABLET ORAL DAILY
COMMUNITY
End: 2021-12-02

## 2021-08-05 RX ORDER — VANCOMYCIN HYDROCHLORIDE 125 MG/1
125 CAPSULE ORAL EVERY 6 HOURS
Status: DISCONTINUED | OUTPATIENT
Start: 2021-08-06 | End: 2021-08-07

## 2021-08-05 RX ORDER — POLYETHYLENE GLYCOL 3350 17 G/17G
17 POWDER, FOR SOLUTION ORAL DAILY PRN
Status: DISCONTINUED | OUTPATIENT
Start: 2021-08-05 | End: 2021-08-07

## 2021-08-05 RX ORDER — SODIUM CHLORIDE 9 MG/ML
125 INJECTION, SOLUTION INTRAVENOUS CONTINUOUS
Status: DISCONTINUED | OUTPATIENT
Start: 2021-08-05 | End: 2021-08-05

## 2021-08-05 RX ORDER — ONDANSETRON 2 MG/ML
4 INJECTION INTRAMUSCULAR; INTRAVENOUS EVERY 6 HOURS PRN
Status: DISCONTINUED | OUTPATIENT
Start: 2021-08-05 | End: 2021-08-07

## 2021-08-05 RX ORDER — GABAPENTIN 300 MG/1
300 CAPSULE ORAL NIGHTLY
Status: DISCONTINUED | OUTPATIENT
Start: 2021-08-05 | End: 2021-08-07

## 2021-08-05 RX ORDER — ASPIRIN 81 MG/1
81 TABLET ORAL DAILY
Status: DISCONTINUED | OUTPATIENT
Start: 2021-08-05 | End: 2021-08-07

## 2021-08-05 RX ORDER — AMLODIPINE BESYLATE 5 MG/1
5 TABLET ORAL DAILY
Status: DISCONTINUED | OUTPATIENT
Start: 2021-08-05 | End: 2021-08-07

## 2021-08-05 RX ORDER — ONDANSETRON 2 MG/ML
4 INJECTION INTRAMUSCULAR; INTRAVENOUS EVERY 4 HOURS PRN
Status: CANCELLED | OUTPATIENT
Start: 2021-08-05 | End: 2021-08-05

## 2021-08-05 RX ORDER — METOCLOPRAMIDE HYDROCHLORIDE 5 MG/ML
5 INJECTION INTRAMUSCULAR; INTRAVENOUS EVERY 8 HOURS PRN
Status: DISCONTINUED | OUTPATIENT
Start: 2021-08-05 | End: 2021-08-07

## 2021-08-05 RX ORDER — ALLOPURINOL 300 MG/1
300 TABLET ORAL DAILY
Status: DISCONTINUED | OUTPATIENT
Start: 2021-08-05 | End: 2021-08-05

## 2021-08-05 RX ORDER — DOXAZOSIN 2 MG/1
2 TABLET ORAL 2 TIMES DAILY
COMMUNITY
End: 2021-12-10

## 2021-08-05 RX ORDER — BISACODYL 10 MG
10 SUPPOSITORY, RECTAL RECTAL
Status: DISCONTINUED | OUTPATIENT
Start: 2021-08-05 | End: 2021-08-07

## 2021-08-05 RX ORDER — HEPARIN SODIUM 5000 [USP'U]/ML
5000 INJECTION, SOLUTION INTRAVENOUS; SUBCUTANEOUS EVERY 8 HOURS SCHEDULED
Status: DISCONTINUED | OUTPATIENT
Start: 2021-08-05 | End: 2021-08-07

## 2021-08-05 RX ORDER — TORSEMIDE 5 MG/1
5 TABLET ORAL DAILY
COMMUNITY
End: 2021-12-02

## 2021-08-05 RX ORDER — FINASTERIDE 5 MG/1
5 TABLET, FILM COATED ORAL DAILY
COMMUNITY

## 2021-08-05 RX ORDER — ATORVASTATIN CALCIUM 40 MG/1
40 TABLET, FILM COATED ORAL DAILY
Status: DISCONTINUED | OUTPATIENT
Start: 2021-08-05 | End: 2021-08-07

## 2021-08-05 RX ORDER — DEXTROSE MONOHYDRATE 25 G/50ML
50 INJECTION, SOLUTION INTRAVENOUS
Status: DISCONTINUED | OUTPATIENT
Start: 2021-08-05 | End: 2021-08-07

## 2021-08-05 NOTE — CONSULTS
[facility]  Report of Consultation    Mohan Martinez Patient Status:  Emergency    1945 MRN [id number]   Location [address] [facility] Attending Viktoriya Starr MD   Hosp Day # 0 PCP Shashi Pulido MD [facility]     Reason for Cons at [facility], treated with rituxan, now in remission   • Osteoarthritis    • Personal history of antineoplastic chemotherapy    • Presbyacusis 3/19/2014    hearing aides bilat   • Prostate CA ([facility] 12/15/2010    seed implant, Dr. Wendy Roman and Dr. Zacarias Cabral, prostate rt Allergies    Medications:    Current Facility-Administered Medications:   •  0.9% NaCl infusion, 125 mL/hr, Intravenous, Continuous  •  potassium chloride (K-DUR M20) CR tab 40 mEq, 40 mEq, Oral, Once  •  potassium chloride 40 mEq in sodium chloride 0.9% 2 AND DINNER AS DIRECTED  insulin glargine (LANTUS) 100 UNIT/ML Subcutaneous Solution, Take 40 Units with breakfast and bedtime  Vitamin D3 2000 UNITS Oral Cap, 4,000 Units nightly.  4000 units per day   LOW-DOSE ASPIRIN 81 MG OR TABS, 1 TABLET DAILY        R PTT 30.9 08/05/2021    INR 1.08 08/05/2021    PTP 14.2 08/05/2021    MG 1.8 08/05/2021    TROP <0.045 08/05/2021       Glucose (mg/dL)   Date Value   06/11/2015 153 (H)   04/27/2015 185 (H)   09/25/2014 157 (H)     BUN (mg/dL)   Date Value   08/05/2021 and has been higher since then. Recent MIKE is most c/w prerenal azotemia with poor intake/diarrhea/hypotension with syncope. Would consider the possibility of atheroembolic disease with slow rise in creat but that would seem less likely.   Start NS and ch

## 2021-08-05 NOTE — ED INITIAL ASSESSMENT (HPI)
Syncopal episode, diarrhea, not keeping food down, abd pain with motion, diabetic, at home  was given 12u lantus and 8u novolog, EMS  and having some blood in his stool.

## 2021-08-05 NOTE — H&P
[facility] HOSPITAL[facility]  History and Physical     Pat Kathryn Patient Status:  Emergency    1945 MRN [id number]   Location [address] [facility] Attending Priya Sosa MD   Hosp Day # 0 PCP MERVIN TIJERINA [facility]     Chief Compl rituxan, now in remission   • Osteoarthritis    • Personal history of antineoplastic chemotherapy    • Presbyacusis 3/19/2014    hearing aides bilat   • Prostate CA ([facility] 12/15/2010    seed implant, Dr. Jocelyn Patel and Dr. Joanna Herzog, prostate rt base 3+3=6, rt mid 3+3= reviewed    Allergies: No Known Allergies    Medications:  No current facility-administered medications on file prior to encounter. amLODIPine Besylate 5 MG Oral Tab, Take 1 tablet (5 mg total) by mouth daily. , Disp: 90 tablet, Rfl: 3  atorvastatin 40 MG 0  benzonatate (TESSALON PERLES) 100 MG Oral Cap, Take 100 mg by mouth as needed. , Disp: , Rfl:   Insulin Lispro (HUMALOG KWIKPEN) 100 UNIT/ML Subcutaneous Solution Pen-injector, INJECT SUBCUTANEOUSLY 25  UNITS WITH BREAKFAST, LUNCH AND DINNER AS DIRECTED,  107   CO2 25.0 22.0   ALKPHO 99 97   AST 16 10*   ALT 21 14*   BILT 0.4 0.4   TP 7.3 6.9       Estimated Creatinine Clearance: 16.3 mL/min (A) (based on SCr of 3.76 mg/dL (H)).     Recent Labs   Lab 08/05/21  1158   PTP 14.2   INR 1.08       COVID-

## 2021-08-05 NOTE — ED PROVIDER NOTES
Patient Seen in: [facility] Emergency Department      History   Patient presents with:  Syncope    Stated Complaint: Syncopal episode, diarrhea, not keeping food down, abd pain with motion, diabet*    HPI/Subjective:   HPI    Patient is a 72-year-ol 12/6/2012     3/19/14   • Murmur     since childbirth   • Muscle weakness    • Neuropathy    • [facility]    Dr. Natasha Gr ( last visit 12/17)  at [facility], treated with rituxan, now in remission   • Osteoarthritis    • Personal his quittin.6      Smokeless tobacco: Never Used    Vaping Use      Vaping Use: Never used    Alcohol use: No      Alcohol/week: 0.0 standard drinks    Drug use:  No             Review of Systems    Positive for stated complaint: Syncopal episode, diarrhea components within normal limits   CBC W/ DIFFERENTIAL - Abnormal; Notable for the following components:    RBC 3.18 (*)     HGB 9.2 (*)     HCT 27.2 (*)     Neutrophil Absolute Prelim 8.04 (*)     Neutrophil Absolute 8.04 (*)     All other components withi enlargement.  Pulmonary vascularity is within normal limits.       Slight bibasilar infiltrates versus atelectasis.       Calcified pleural plaque left lung base.       Tortuous thoracic aorta.       Mild blunting both costophrenic angles.  No pneumothorax

## 2021-08-05 NOTE — ED QUICK NOTES
Orders for admission, patient is aware of plan and ready to go upstairs. Any questions, please call ED RN Greer Nissen at extension [phone]. Vaccinated?   Type of COVID test sent:  COVID Suspicion level: Low/High      Titratable drug(s) infusing: Potassium  Rate:

## 2021-08-06 PROBLEM — I10 ESSENTIAL HYPERTENSION: Status: ACTIVE | Noted: 2019-03-10

## 2021-08-06 LAB
ALBUMIN SERPL-MCNC: 1.8 G/DL (ref 3.4–5)
ALBUMIN/GLOB SERPL: 0.4 {RATIO} (ref 1–2)
ALP LIVER SERPL-CCNC: 93 U/L
ALT SERPL-CCNC: 14 U/L
ANION GAP SERPL CALC-SCNC: 7 MMOL/L (ref 0–18)
AST SERPL-CCNC: 15 U/L (ref 15–37)
BASOPHILS # BLD AUTO: 0.03 X10(3) UL (ref 0–0.2)
BASOPHILS NFR BLD AUTO: 0.3 %
BILIRUB SERPL-MCNC: 0.3 MG/DL (ref 0.1–2)
BUN BLD-MCNC: 49 MG/DL (ref 7–18)
CALCIUM BLD-MCNC: 7.5 MG/DL (ref 8.5–10.1)
CHLORIDE SERPL-SCNC: 114 MMOL/L (ref 98–112)
CO2 SERPL-SCNC: 22 MMOL/L (ref 21–32)
CREAT BLD-MCNC: 3.31 MG/DL
EOSINOPHIL # BLD AUTO: 0.23 X10(3) UL (ref 0–0.7)
EOSINOPHIL NFR BLD AUTO: 2.1 %
ERYTHROCYTE [DISTWIDTH] IN BLOOD BY AUTOMATED COUNT: 14.6 %
GLOBULIN PLAS-MCNC: 4.9 G/DL (ref 2.8–4.4)
GLUCOSE BLD-MCNC: 127 MG/DL (ref 70–99)
GLUCOSE BLD-MCNC: 145 MG/DL (ref 70–99)
GLUCOSE BLD-MCNC: 208 MG/DL (ref 70–99)
GLUCOSE BLD-MCNC: 216 MG/DL (ref 70–99)
GLUCOSE BLD-MCNC: 262 MG/DL (ref 70–99)
HAV IGM SER QL: 2 MG/DL (ref 1.6–2.6)
HCT VFR BLD AUTO: 24.8 %
HGB BLD-MCNC: 8.4 G/DL
IMM GRANULOCYTES # BLD AUTO: 0.04 X10(3) UL (ref 0–1)
IMM GRANULOCYTES NFR BLD: 0.4 %
LYMPHOCYTES # BLD AUTO: 3.58 X10(3) UL (ref 1–4)
LYMPHOCYTES NFR BLD AUTO: 32.9 %
M PROTEIN MFR SERPL ELPH: 6.7 G/DL (ref 6.4–8.2)
MCH RBC QN AUTO: 29.6 PG (ref 26–34)
MCHC RBC AUTO-ENTMCNC: 33.9 G/DL (ref 31–37)
MCV RBC AUTO: 87.3 FL
MONOCYTES # BLD AUTO: 0.75 X10(3) UL (ref 0.1–1)
MONOCYTES NFR BLD AUTO: 6.9 %
NEUTROPHILS # BLD AUTO: 6.25 X10 (3) UL (ref 1.5–7.7)
NEUTROPHILS # BLD AUTO: 6.25 X10(3) UL (ref 1.5–7.7)
NEUTROPHILS NFR BLD AUTO: 57.4 %
OSMOLALITY SERPL CALC.SUM OF ELEC: 311 MOSM/KG (ref 275–295)
PLATELET # BLD AUTO: 253 10(3)UL (ref 150–450)
POTASSIUM SERPL-SCNC: 3.3 MMOL/L (ref 3.5–5.1)
RBC # BLD AUTO: 2.84 X10(6)UL
SODIUM SERPL-SCNC: 143 MMOL/L (ref 136–145)
WBC # BLD AUTO: 10.9 X10(3) UL (ref 4–11)

## 2021-08-06 PROCEDURE — 99213 OFFICE O/P EST LOW 20 MIN: CPT | Performed by: INTERNAL MEDICINE

## 2021-08-06 PROCEDURE — 99226 SUBSEQUENT OBSERVATION CARE: CPT | Performed by: STUDENT IN AN ORGANIZED HEALTH CARE EDUCATION/TRAINING PROGRAM

## 2021-08-06 RX ORDER — HYDRALAZINE HYDROCHLORIDE 50 MG/1
100 TABLET, FILM COATED ORAL 3 TIMES DAILY
Status: DISCONTINUED | OUTPATIENT
Start: 2021-08-06 | End: 2021-08-07

## 2021-08-06 RX ORDER — METOPROLOL TARTRATE 100 MG/1
100 TABLET ORAL
Status: DISCONTINUED | OUTPATIENT
Start: 2021-08-06 | End: 2021-08-07

## 2021-08-06 RX ORDER — METOPROLOL TARTRATE 100 MG/1
200 TABLET ORAL DAILY
Status: DISCONTINUED | OUTPATIENT
Start: 2021-08-06 | End: 2021-08-06 | Stop reason: DRUGHIGH

## 2021-08-06 RX ORDER — TERAZOSIN 2 MG/1
2 CAPSULE ORAL 2 TIMES DAILY
Status: DISCONTINUED | OUTPATIENT
Start: 2021-08-06 | End: 2021-08-07

## 2021-08-06 RX ORDER — TAMSULOSIN HYDROCHLORIDE 0.4 MG/1
0.4 CAPSULE ORAL DAILY
Status: DISCONTINUED | OUTPATIENT
Start: 2021-08-06 | End: 2021-08-07

## 2021-08-06 RX ORDER — LEVOTHYROXINE SODIUM 0.07 MG/1
75 TABLET ORAL
Status: DISCONTINUED | OUTPATIENT
Start: 2021-08-06 | End: 2021-08-07

## 2021-08-06 RX ORDER — LEVOTHYROXINE SODIUM 0.05 MG/1
50 TABLET ORAL
Status: DISCONTINUED | OUTPATIENT
Start: 2021-08-07 | End: 2021-08-07

## 2021-08-06 RX ORDER — FINASTERIDE 5 MG/1
5 TABLET, FILM COATED ORAL DAILY
Status: DISCONTINUED | OUTPATIENT
Start: 2021-08-06 | End: 2021-08-07

## 2021-08-06 NOTE — PLAN OF CARE
Assumed care of patient at [address]. Patient alert and oriented x4. BP elevated with systolic in 697P, on call hospitalist paged and 1 time order for IV Labetalol obtained. Patient having frequent loose stools, stool sample sent and resulted (+) for Cdif.  MD loving medication profile  Outcome: Progressing  Goal: Maintains adequate nutritional intake (undernourished)  Description: INTERVENTIONS:  - Monitor percentage of each meal consumed  - Identify factors contributing to decreased intake, treat as appropriate  - As

## 2021-08-06 NOTE — PLAN OF CARE
Pt rec'd from ER. He is awake and alert. He was oriented to the room, bed controls and call light. He was accompanied by his spouse. Undersdtandindg verbalized by both. Urine was sent for U/A and CS, slong with urine studies.  Stool for Cdiff and Stool

## 2021-08-06 NOTE — PLAN OF CARE
Assumed care of patient around 0730. Patient sitting up in bed, ox4. Denies any chest pain or sob at rest. Room air, NSR on tele. Elevated BP in AM, home meds resumed. Hard of colette. Enteric plus precautions. Dr. Sacha Burgos notified of positive shig toxin.  Up decreased intake, treat as appropriate  - Assist with meals as needed  - Monitor I&O, WT and lab values  - Obtain nutritional consult as needed  - Optimize oral hygiene and moisture  - Encourage food from home; allow for food preferences  - Enhance eating

## 2021-08-06 NOTE — PROGRESS NOTES
[facility]     Nephrology Progress Note    Amrik aBptiste Patient Status:  Observation    1945 MRN [id number]   [facility] 2NE-A Attending Reji Solo MD   Hosp Day # 0 PCP MERVIN Noble       SUBJECTIVE:  States 08/06/21  0553 08/06/21  1153   PGLU 277* 145* 208*       Meds:   insulin detemir (LEVEMIR) 100 UNIT/ML flextouch 10 Units, 10 Units, Subcutaneous, Nightly  finasteride (PROSCAR) tab 5 mg, 5 mg, Oral, Daily  [START ON 8/7/2021] Levothyroxine Sodium (SYNTHR Q6H  allopurinol (ZYLOPRIM) tab 100 mg, 100 mg, Oral, [email or web address]          Impression/Plan:    #1.   MIKE/CKD IV- has had longstanding CKD most likely due to DM/HTN with longstanding proteinuria, elevated A1C, etc.  Interestingly his renal fxn had been stable

## 2021-08-06 NOTE — PROGRESS NOTES
[facility]  Progress Note     Emery Chakraborty Patient Status:  Observation    1945 MRN [id number]   [facility] 2NE-A Attending Sherri Lee MD   Hosp Day # 0 PCP MERVIN CHOUDHARY     Chief Complaint: Weakness, diar Lab 08/05/21  1158   PTP 14.2   INR 1.08            COVID-19 Lab Results    COVID-19  Lab Results   Component Value Date    COVID19 Not Detected 08/05/2021       Pro-Calcitonin  No results for input(s): PCT in the last 168 hours.     Cardiac  Recent Labs be discharge to: home?     Plan of care discussed with PT, wife, RN     Tania Gramajo MD          **Certification      PHYSICIAN Certification of Need for Inpatient Hospitalization - Initial Certification    Patient will require inpatient services that will

## 2021-08-06 NOTE — PHYSICAL THERAPY NOTE
Physical Therapy    Orders for PT eval received. Attempted to eval pt, but pt and spouse refusing inpatient therapy saying pt has been walking around in the room and he is back to his baseline.   Were requesting outpatient therapy - encouraged them to ask

## 2021-08-07 VITALS
SYSTOLIC BLOOD PRESSURE: 153 MMHG | DIASTOLIC BLOOD PRESSURE: 52 MMHG | RESPIRATION RATE: 18 BRPM | TEMPERATURE: 99 F | WEIGHT: 143.94 LBS | OXYGEN SATURATION: 99 % | BODY MASS INDEX: 21 KG/M2 | HEART RATE: 73 BPM

## 2021-08-07 LAB
ANION GAP SERPL CALC-SCNC: 5 MMOL/L (ref 0–18)
BUN BLD-MCNC: 44 MG/DL (ref 7–18)
CALCIUM BLD-MCNC: 7.7 MG/DL (ref 8.5–10.1)
CHLORIDE SERPL-SCNC: 115 MMOL/L (ref 98–112)
CO2 SERPL-SCNC: 24 MMOL/L (ref 21–32)
CREAT BLD-MCNC: 3.15 MG/DL
GLUCOSE BLD-MCNC: 170 MG/DL (ref 70–99)
GLUCOSE BLD-MCNC: 227 MG/DL (ref 70–99)
GLUCOSE BLD-MCNC: 80 MG/DL (ref 70–99)
GLUCOSE BLD-MCNC: 90 MG/DL (ref 70–99)
OSMOLALITY SERPL CALC.SUM OF ELEC: 308 MOSM/KG (ref 275–295)
POTASSIUM SERPL-SCNC: 3.3 MMOL/L (ref 3.5–5.1)
SODIUM SERPL-SCNC: 144 MMOL/L (ref 136–145)

## 2021-08-07 PROCEDURE — 99213 OFFICE O/P EST LOW 20 MIN: CPT | Performed by: INTERNAL MEDICINE

## 2021-08-07 RX ORDER — VANCOMYCIN HYDROCHLORIDE 125 MG/1
125 CAPSULE ORAL 4 TIMES DAILY
Status: DISCONTINUED | OUTPATIENT
Start: 2021-08-07 | End: 2021-08-07

## 2021-08-07 RX ORDER — METOPROLOL TARTRATE 100 MG/1
TABLET ORAL
Qty: 180 TABLET | Refills: 3 | Status: SHIPPED | OUTPATIENT
Start: 2021-08-07

## 2021-08-07 RX ORDER — VANCOMYCIN HYDROCHLORIDE 125 MG/1
125 CAPSULE ORAL EVERY 6 HOURS
Qty: 48 CAPSULE | Refills: 0 | Status: SHIPPED | OUTPATIENT
Start: 2021-08-07 | End: 2021-08-19

## 2021-08-07 RX ORDER — CHOLECALCIFEROL (VITAMIN D3) 10(400)/ML
1 DROPS ORAL AS NEEDED
Qty: 30 EACH | Refills: 1 | Status: SHIPPED | OUTPATIENT
Start: 2021-08-07 | End: 2021-12-02

## 2021-08-07 NOTE — PROGRESS NOTES
[facility]  Progress Note     Marc Nguyen Patient Status:  Observation    1945 MRN [id number]   [facility] 2NE-A Attending Beckie Rea MD   Hosp Day # 0 PCP MERVIN CHOUDHARY     Chief Complaint: Weakness, diar 93  --    AST 16  --  10* 15  --    ALT 21  --  14* 14*  --    BILT 0.4  --  0.4 0.3  --    TP 7.3  --  6.9 6.7  --     < > = values in this interval not displayed. Estimated Creatinine Clearance: 18.1 mL/min (A) (based on SCr of 3.15 mg/dL (H)). COVID testing is negative, may discontinue isolation: yes      Will the patient be referred to TCC on discharge?: no  Estimated date of discharge: tbd  Discharge is dependent on: clinical   At this point Mr. Kenya Andres is expected to be discharge to: home?     Pl

## 2021-08-07 NOTE — PROGRESS NOTES
[facility]     Nephrology Progress Note    Opal Hammond Patient Status:  Observation    1945 MRN [id number]   [address] [facility] Attending Anmol Baer MD   Hosp Day # 0 PCP MERVIN CHOUDHARY       SUBJECTIVE:  Looks/ 08/06/21  0619   ALT 21 14* 14*   AST 16 10* 15   ALB 2.0* 1.9* 1.8*       Recent Labs   Lab 08/06/21  1740 08/06/21  2123 08/07/21  0117 08/07/21  0556 08/07/21  1237   PGLU 216* 262* 170* 90 227*       Meds:   insulin detemir (LEVEMIR) 100 UNIT/ML flexto TID CC  vancomycin (VANCOCIN) cap 125 mg, 125 mg, Oral, Q6H  allopurinol (ZYLOPRIM) tab 100 mg, 100 mg, Oral, [email or web address]          Impression/Plan:    #1.   MIKE/CKD IV- has had longstanding CKD most likely due to DM/HTN with longstanding proteinuria, elevate

## 2021-08-07 NOTE — PLAN OF CARE
Alert. Oriented. Sbrady per tele. Hr 50s. Denies cp, sob or abd pain. Had sl headache. Tylenol given. On oral vanco for cdiff. Contact plus isolation maintained. Up w/ 1 and walker. Gait unsteady. Fall precaution maintained and observed. Poc updated w/ pt.

## 2021-08-07 NOTE — PLAN OF CARE
Assumed care of patient around 0730. Patient sitting up in chair, ox4. Wife at bedside. Denies any sob or chest pain. Room air. NSR on tele. Left hearing aid in place. No diarrhea overnight and so far today. Possible discharge later today.  Patient and wife appropriate  - Assist with meals as needed  - Monitor I&O, WT and lab values  - Obtain nutritional consult as needed  - Optimize oral hygiene and moisture  - Encourage food from home; allow for food preferences  - Enhance eating environment  Outcome: Progr

## 2021-08-07 NOTE — PLAN OF CARE
Discharge instructions given. Wife at bedside. New medication discussed. Fu apts discussed. All questions answered. Patient and wife indicated understanding.

## 2021-08-07 NOTE — DISCHARGE SUMMARY
[facility] HOSPITALIST  DISCHARGE SUMMARY     Davon Cyrstal Patient Status:  Observation    1945 MRN [id number]   [facility] 2NE-A Attending Constantine Avalos MD   Hosp Day # 0 PCP Deric Red     Date of Admission: 2021 Instructions Prescription details   amLODIPine besylate 5 MG Tabs  Commonly known as: NORVASC  What changed: additional instructions      Take 1 tablet (5 mg total) by mouth daily.    Quantity: 90 tablet  Refills: 3     Insulin Lispro 100 UNIT/ML Sopn  Comm Levothyroxine Sodium 75 MCG Tabs      TK 1 T PO ON MON AND FRI   Refills: 0     Low-Dose Aspirin 81 MG Tabs  Generic drug: aspirin      1 TABLET DAILY   Refills: 0     Misc.  Devices Misc      Cam boot, right foot Dx: Fourth, fifth proximal toe fractures Follow-up appointment:   Gloria Maria MD  [address] [email or web address]  [phone]    In 2 weeks      Appointments for Next 30 Days 8/7/2021 - 9/6/2021      None            Vital signs:  Temp:  [96.6 °F (35.9 °C)-98.6 °F (37

## 2021-08-09 ENCOUNTER — PATIENT OUTREACH (OUTPATIENT)
Dept: CASE MANAGEMENT | Age: 76
End: 2021-08-09

## 2021-08-09 NOTE — PROGRESS NOTES
1st attempt to contact Pt to help schedule the following AVS/ADT  HFU Appts:       Follow-up appointment:   Eduardo Starr MD  [address] [place] [address]  [phone]     In 2 weeks      SW Pt's wife, Dulce Coyle, Will schedule as Pt

## 2021-08-19 LAB
ADENOVIRUS F 40/41 PCR: NEGATIVE
ASTROVIRUS PCR: NEGATIVE
C CAYETANENSIS DNA SPEC QL NAA+PROBE: NEGATIVE
CAMPY SP DNA.DIARRHEA STL QL NAA+PROBE: NEGATIVE
CRYPTOSP DNA SPEC QL NAA+PROBE: NEGATIVE
E COLI O157H7 DNA SPEC QL NAA+PROBE: NEGATIVE
EAEC PAA PLAS AGGR+AATA ST NAA+NON-PRB: NEGATIVE
EC STX1+STX2 + H7 FLIC SPEC NAA+PROBE: POSITIVE
ENTAMOEBA HISTOLYTICA PCR: NEGATIVE
ETEC LTA+ST1A+ST1B TOX ST NAA+NON-PROBE: NEGATIVE
GIARDIA LAMBLIA PCR: NEGATIVE
NOROVIRUS GI/GII PCR: NEGATIVE
P SHIGELLOIDES DNA STL QL NAA+PROBE: NEGATIVE
ROTAVIRUS A PCR: NEGATIVE
SALMONELLA DNA SPEC QL NAA+PROBE: NEGATIVE
SAPOVIRUS PCR: NEGATIVE
SHIGELLA SP+EIEC IPAH ST NAA+NON-PROBE: NEGATIVE
V CHOLERAE DNA SPEC QL NAA+PROBE: NEGATIVE
VIBRIO DNA SPEC NAA+PROBE: NEGATIVE
YERSINIA DNA SPEC NAA+PROBE: NEGATIVE

## 2021-08-27 ENCOUNTER — APPOINTMENT (OUTPATIENT)
Dept: GENERAL RADIOLOGY | Facility: HOSPITAL | Age: 76
End: 2021-08-27
Attending: EMERGENCY MEDICINE
Payer: MEDICARE

## 2021-08-27 ENCOUNTER — HOSPITAL ENCOUNTER (EMERGENCY)
Facility: HOSPITAL | Age: 76
Discharge: HOME | End: 2021-08-27
Attending: EMERGENCY MEDICINE
Payer: MEDICARE

## 2021-08-27 VITALS
TEMPERATURE: 98 F | OXYGEN SATURATION: 97 % | HEART RATE: 70 BPM | DIASTOLIC BLOOD PRESSURE: 65 MMHG | RESPIRATION RATE: 22 BRPM | WEIGHT: 138 LBS | BODY MASS INDEX: 20.44 KG/M2 | SYSTOLIC BLOOD PRESSURE: 201 MMHG | HEIGHT: 69 IN

## 2021-08-27 DIAGNOSIS — R05.9 COUGH: Primary | ICD-10-CM

## 2021-08-27 DIAGNOSIS — J81.0 ACUTE PULMONARY EDEMA (HCC): ICD-10-CM

## 2021-08-27 LAB
ALBUMIN SERPL-MCNC: 2.3 G/DL (ref 3.4–5)
ALBUMIN/GLOB SERPL: 0.4 {RATIO} (ref 1–2)
ALP LIVER SERPL-CCNC: 84 U/L
ALT SERPL-CCNC: 20 U/L
ANION GAP SERPL CALC-SCNC: 9 MMOL/L (ref 0–18)
AST SERPL-CCNC: 23 U/L (ref 15–37)
BASOPHILS # BLD AUTO: 0.03 X10(3) UL (ref 0–0.2)
BASOPHILS NFR BLD AUTO: 0.3 %
BILIRUB SERPL-MCNC: 0.4 MG/DL (ref 0.1–2)
BILIRUB UR QL STRIP.AUTO: NEGATIVE
BUN BLD-MCNC: 39 MG/DL (ref 7–18)
CALCIUM BLD-MCNC: 8.2 MG/DL (ref 8.5–10.1)
CHLORIDE SERPL-SCNC: 100 MMOL/L (ref 98–112)
CLARITY UR REFRACT.AUTO: CLEAR
CO2 SERPL-SCNC: 25 MMOL/L (ref 21–32)
COLOR UR AUTO: YELLOW
CREAT BLD-MCNC: 3.31 MG/DL
EOSINOPHIL # BLD AUTO: 0.14 X10(3) UL (ref 0–0.7)
EOSINOPHIL NFR BLD AUTO: 1.2 %
ERYTHROCYTE [DISTWIDTH] IN BLOOD BY AUTOMATED COUNT: 14.1 %
GLOBULIN PLAS-MCNC: 5.2 G/DL (ref 2.8–4.4)
GLUCOSE BLD-MCNC: 194 MG/DL (ref 70–99)
GLUCOSE UR STRIP.AUTO-MCNC: 50 MG/DL
HCT VFR BLD AUTO: 28.2 %
HGB BLD-MCNC: 9 G/DL
HYALINE CASTS #/AREA URNS AUTO: PRESENT /LPF
IMM GRANULOCYTES # BLD AUTO: 0.04 X10(3) UL (ref 0–1)
IMM GRANULOCYTES NFR BLD: 0.3 %
KETONES UR STRIP.AUTO-MCNC: NEGATIVE MG/DL
LEUKOCYTE ESTERASE UR QL STRIP.AUTO: NEGATIVE
LYMPHOCYTES # BLD AUTO: 3.35 X10(3) UL (ref 1–4)
LYMPHOCYTES NFR BLD AUTO: 28.1 %
M PROTEIN MFR SERPL ELPH: 7.5 G/DL (ref 6.4–8.2)
MCH RBC QN AUTO: 28.1 PG (ref 26–34)
MCHC RBC AUTO-ENTMCNC: 31.9 G/DL (ref 31–37)
MCV RBC AUTO: 88.1 FL
MONOCYTES # BLD AUTO: 0.62 X10(3) UL (ref 0.1–1)
MONOCYTES NFR BLD AUTO: 5.2 %
NEUTROPHILS # BLD AUTO: 7.73 X10 (3) UL (ref 1.5–7.7)
NEUTROPHILS # BLD AUTO: 7.73 X10(3) UL (ref 1.5–7.7)
NEUTROPHILS NFR BLD AUTO: 64.9 %
NITRITE UR QL STRIP.AUTO: NEGATIVE
NT-PROBNP SERPL-MCNC: ABNORMAL PG/ML (ref ?–450)
OSMOLALITY SERPL CALC.SUM OF ELEC: 293 MOSM/KG (ref 275–295)
PH UR STRIP.AUTO: 6 [PH] (ref 5–8)
PLATELET # BLD AUTO: 256 10(3)UL (ref 150–450)
POTASSIUM SERPL-SCNC: 3.3 MMOL/L (ref 3.5–5.1)
PROT UR STRIP.AUTO-MCNC: >=500 MG/DL
RBC # BLD AUTO: 3.2 X10(6)UL
SARS-COV-2 RNA RESP QL NAA+PROBE: NOT DETECTED
SODIUM SERPL-SCNC: 134 MMOL/L (ref 136–145)
SP GR UR STRIP.AUTO: 1.01 (ref 1–1.03)
UROBILINOGEN UR STRIP.AUTO-MCNC: <2 MG/DL
WBC # BLD AUTO: 11.9 X10(3) UL (ref 4–11)

## 2021-08-27 PROCEDURE — 81001 URINALYSIS AUTO W/SCOPE: CPT | Performed by: EMERGENCY MEDICINE

## 2021-08-27 PROCEDURE — 85025 COMPLETE CBC W/AUTO DIFF WBC: CPT | Performed by: EMERGENCY MEDICINE

## 2021-08-27 PROCEDURE — 99285 EMERGENCY DEPT VISIT HI MDM: CPT

## 2021-08-27 PROCEDURE — 93010 ELECTROCARDIOGRAM REPORT: CPT

## 2021-08-27 PROCEDURE — 71046 X-RAY EXAM CHEST 2 VIEWS: CPT | Performed by: EMERGENCY MEDICINE

## 2021-08-27 PROCEDURE — 83880 ASSAY OF NATRIURETIC PEPTIDE: CPT | Performed by: EMERGENCY MEDICINE

## 2021-08-27 PROCEDURE — 96374 THER/PROPH/DIAG INJ IV PUSH: CPT

## 2021-08-27 PROCEDURE — 80053 COMPREHEN METABOLIC PANEL: CPT | Performed by: EMERGENCY MEDICINE

## 2021-08-27 PROCEDURE — 93005 ELECTROCARDIOGRAM TRACING: CPT

## 2021-08-27 RX ORDER — ACETAMINOPHEN 500 MG
1000 TABLET ORAL ONCE
Status: COMPLETED | OUTPATIENT
Start: 2021-08-27 | End: 2021-08-27

## 2021-08-27 RX ORDER — SPIRONOLACTONE 25 MG/1
25 TABLET ORAL 2 TIMES DAILY
COMMUNITY

## 2021-08-27 RX ORDER — BENZONATATE 100 MG/1
100 CAPSULE ORAL 3 TIMES DAILY PRN
Qty: 30 CAPSULE | Refills: 0 | Status: SHIPPED | OUTPATIENT
Start: 2021-08-27 | End: 2021-09-26

## 2021-08-27 RX ORDER — HYDRALAZINE HYDROCHLORIDE 20 MG/ML
10 INJECTION INTRAMUSCULAR; INTRAVENOUS ONCE
Status: COMPLETED | OUTPATIENT
Start: 2021-08-27 | End: 2021-08-27

## 2021-08-27 NOTE — ED INITIAL ASSESSMENT (HPI)
Pt presents to ED with c/o cough since 1 week after discharge from THE [facility]. Pt went to MD today, xray done and noted. fluid in lungs.

## 2021-08-27 NOTE — ED PROVIDER NOTES
Patient Seen in: [facility] Emergency Department      History   Patient presents with:  Cough/URI    Stated Complaint: Cough    HPI/Subjective:   HPI    25-year-old male presents to the emergency room after he was evaluated in the immediate care tod • NHL (non-Hodgkin's lymphoma) ([facility] [address] 12/15/2010    Dr. Nany Patterson ( last visit 12/17)  at [facility], treated with rituxan, now in remission   • Osteoarthritis    • Personal history of antineoplastic chemotherapy    • Presbyacusis 3/19/2014    hearing aides bilat   • Alcohol use: No      Alcohol/week: 0.0 standard drinks    Drug use: No             Review of Systems    Positive for stated complaint: Cough  Other systems are as noted in HPI. Constitutional and vital signs reviewed.       All other systems reviewed and n Peptide 28,959 (*)     All other components within normal limits   CBC W/ DIFFERENTIAL - Abnormal; Notable for the following components:    WBC 11.9 (*)     RBC 3.20 (*)     HGB 9.0 (*)     HCT 28.2 (*)     Neutrophil Absolute Prelim 7.73 (*)     Neutrophi on 8/27/2021 at 5:42 PM     Finalized by (CST): Norma Madden MD on 8/27/2021 at 5:43 PM                      MDM      13-year-old male presents tachypneic with dry cough. Chest x-ray at immediate care showed evidence of fluid overload.   proBNP is 29,0 daily as needed for cough. Qty: 30 capsule Refills: 0    !! - Potential duplicate medications found. Please discuss with provider.

## 2021-08-28 LAB
ATRIAL RATE: 67 BPM
P AXIS: 7 DEGREES
P-R INTERVAL: 174 MS
Q-T INTERVAL: 490 MS
QRS DURATION: 138 MS
QTC CALCULATION (BEZET): 517 MS
R AXIS: -60 DEGREES
T AXIS: 25 DEGREES
VENTRICULAR RATE: 67 BPM

## 2021-09-10 ENCOUNTER — LAB ENCOUNTER (OUTPATIENT)
Dept: LAB | Age: 76
End: 2021-09-10
Attending: INTERNAL MEDICINE
Payer: MEDICARE

## 2021-09-10 DIAGNOSIS — M35.3 POLYMYALGIA RHEUMATICA (HCC): ICD-10-CM

## 2021-09-10 DIAGNOSIS — E11.8 DIABETIC COMPLICATION (HCC): ICD-10-CM

## 2021-09-10 DIAGNOSIS — N18.4 CHRONIC KIDNEY DISEASE, STAGE IV (SEVERE) (HCC): Primary | ICD-10-CM

## 2021-09-10 DIAGNOSIS — E55.9 AVITAMINOSIS D: ICD-10-CM

## 2021-09-10 DIAGNOSIS — E87.1 HYPOSMOLALITY SYNDROME: ICD-10-CM

## 2021-09-10 DIAGNOSIS — I10 ESSENTIAL HYPERTENSION, MALIGNANT: ICD-10-CM

## 2021-09-10 DIAGNOSIS — C61 MALIGNANT NEOPLASM OF PROSTATE (HCC): ICD-10-CM

## 2021-09-10 DIAGNOSIS — C88.0 MACROGLOBULINEMIA (HCC): ICD-10-CM

## 2021-09-10 LAB
BASOPHILS # BLD AUTO: 0.03 X10(3) UL (ref 0–0.2)
BASOPHILS NFR BLD AUTO: 0.3 %
EOSINOPHIL # BLD AUTO: 0.16 X10(3) UL (ref 0–0.7)
EOSINOPHIL NFR BLD AUTO: 1.5 %
ERYTHROCYTE [DISTWIDTH] IN BLOOD BY AUTOMATED COUNT: 15.9 %
HCT VFR BLD AUTO: 30 %
HGB BLD-MCNC: 9.2 G/DL
IMM GRANULOCYTES # BLD AUTO: 0.05 X10(3) UL (ref 0–1)
IMM GRANULOCYTES NFR BLD: 0.5 %
LYMPHOCYTES # BLD AUTO: 2.53 X10(3) UL (ref 1–4)
LYMPHOCYTES NFR BLD AUTO: 23.8 %
MCH RBC QN AUTO: 29 PG (ref 26–34)
MCHC RBC AUTO-ENTMCNC: 30.7 G/DL (ref 31–37)
MCV RBC AUTO: 94.6 FL
MONOCYTES # BLD AUTO: 0.49 X10(3) UL (ref 0.1–1)
MONOCYTES NFR BLD AUTO: 4.6 %
NEUTROPHILS # BLD AUTO: 7.39 X10 (3) UL (ref 1.5–7.7)
NEUTROPHILS # BLD AUTO: 7.39 X10(3) UL (ref 1.5–7.7)
NEUTROPHILS NFR BLD AUTO: 69.3 %
PLATELET # BLD AUTO: 288 10(3)UL (ref 150–450)
RBC # BLD AUTO: 3.17 X10(6)UL
WBC # BLD AUTO: 10.7 X10(3) UL (ref 4–11)

## 2021-09-10 PROCEDURE — 36415 COLL VENOUS BLD VENIPUNCTURE: CPT

## 2021-09-10 PROCEDURE — 85025 COMPLETE CBC W/AUTO DIFF WBC: CPT

## 2021-09-24 ENCOUNTER — LAB ENCOUNTER (OUTPATIENT)
Dept: LAB | Age: 76
End: 2021-09-24
Attending: INTERNAL MEDICINE
Payer: MEDICARE

## 2021-09-24 DIAGNOSIS — C88.0 MACROGLOBULINEMIA (HCC): ICD-10-CM

## 2021-09-24 DIAGNOSIS — M35.3 POLYMYALGIA RHEUMATICA (HCC): ICD-10-CM

## 2021-09-24 DIAGNOSIS — E55.9 AVITAMINOSIS D: ICD-10-CM

## 2021-09-24 DIAGNOSIS — E11.8 DIABETIC COMPLICATION (HCC): ICD-10-CM

## 2021-09-24 DIAGNOSIS — N18.4 CHRONIC KIDNEY DISEASE, STAGE IV (SEVERE) (HCC): ICD-10-CM

## 2021-09-24 DIAGNOSIS — I10 ESSENTIAL HYPERTENSION, MALIGNANT: ICD-10-CM

## 2021-09-24 DIAGNOSIS — C61 MALIGNANT NEOPLASM OF PROSTATE (HCC): ICD-10-CM

## 2021-09-24 DIAGNOSIS — E87.1 HYPOSMOLALITY SYNDROME: ICD-10-CM

## 2021-09-24 LAB
BASOPHILS # BLD AUTO: 0.02 X10(3) UL (ref 0–0.2)
BASOPHILS NFR BLD AUTO: 0.3 %
EOSINOPHIL # BLD AUTO: 0.11 X10(3) UL (ref 0–0.7)
EOSINOPHIL NFR BLD AUTO: 1.5 %
ERYTHROCYTE [DISTWIDTH] IN BLOOD BY AUTOMATED COUNT: 15.3 %
HCT VFR BLD AUTO: 35.1 %
HGB BLD-MCNC: 11.1 G/DL
IMM GRANULOCYTES # BLD AUTO: 0.01 X10(3) UL (ref 0–1)
IMM GRANULOCYTES NFR BLD: 0.1 %
LYMPHOCYTES # BLD AUTO: 2.25 X10(3) UL (ref 1–4)
LYMPHOCYTES NFR BLD AUTO: 30 %
MCH RBC QN AUTO: 29.7 PG (ref 26–34)
MCHC RBC AUTO-ENTMCNC: 31.6 G/DL (ref 31–37)
MCV RBC AUTO: 93.9 FL
MONOCYTES # BLD AUTO: 0.44 X10(3) UL (ref 0.1–1)
MONOCYTES NFR BLD AUTO: 5.9 %
NEUTROPHILS # BLD AUTO: 4.67 X10 (3) UL (ref 1.5–7.7)
NEUTROPHILS # BLD AUTO: 4.67 X10(3) UL (ref 1.5–7.7)
NEUTROPHILS NFR BLD AUTO: 62.2 %
PLATELET # BLD AUTO: 309 10(3)UL (ref 150–450)
RBC # BLD AUTO: 3.74 X10(6)UL
WBC # BLD AUTO: 7.5 X10(3) UL (ref 4–11)

## 2021-09-24 PROCEDURE — 36415 COLL VENOUS BLD VENIPUNCTURE: CPT

## 2021-09-24 PROCEDURE — 85025 COMPLETE CBC W/AUTO DIFF WBC: CPT

## 2021-10-20 ENCOUNTER — LAB ENCOUNTER (OUTPATIENT)
Dept: LAB | Age: 76
End: 2021-10-20
Attending: INTERNAL MEDICINE
Payer: MEDICARE

## 2021-10-20 DIAGNOSIS — C88.0 MACROGLOBULINEMIA (HCC): ICD-10-CM

## 2021-10-20 DIAGNOSIS — I10 ESSENTIAL HYPERTENSION, MALIGNANT: ICD-10-CM

## 2021-10-20 DIAGNOSIS — N18.4 CHRONIC KIDNEY DISEASE, STAGE IV (SEVERE) (HCC): ICD-10-CM

## 2021-10-20 DIAGNOSIS — E55.9 AVITAMINOSIS D: ICD-10-CM

## 2021-10-20 DIAGNOSIS — E87.1 HYPOSMOLALITY SYNDROME: ICD-10-CM

## 2021-10-20 DIAGNOSIS — E11.8 DIABETIC COMPLICATION (HCC): ICD-10-CM

## 2021-10-20 DIAGNOSIS — C61 MALIGNANT NEOPLASM OF PROSTATE (HCC): ICD-10-CM

## 2021-10-20 DIAGNOSIS — M35.3 POLYMYALGIA RHEUMATICA (HCC): ICD-10-CM

## 2021-10-20 PROCEDURE — 85025 COMPLETE CBC W/AUTO DIFF WBC: CPT

## 2021-10-20 PROCEDURE — 36415 COLL VENOUS BLD VENIPUNCTURE: CPT

## 2021-11-16 ENCOUNTER — LAB ENCOUNTER (OUTPATIENT)
Dept: LAB | Age: 76
End: 2021-11-16
Attending: INTERNAL MEDICINE
Payer: MEDICARE

## 2021-11-16 DIAGNOSIS — E87.1 HYPOSMOLALITY SYNDROME: ICD-10-CM

## 2021-11-16 DIAGNOSIS — C88.0 MACROGLOBULINEMIA (HCC): ICD-10-CM

## 2021-11-16 DIAGNOSIS — E11.8 DIABETIC COMPLICATION (HCC): ICD-10-CM

## 2021-11-16 DIAGNOSIS — E55.9 AVITAMINOSIS D: ICD-10-CM

## 2021-11-16 DIAGNOSIS — C88.0 WALDENSTROM MACROGLOBULINEMIA (HCC): ICD-10-CM

## 2021-11-16 DIAGNOSIS — N18.4 CHRONIC KIDNEY DISEASE, STAGE IV (SEVERE) (HCC): ICD-10-CM

## 2021-11-16 DIAGNOSIS — C61 MALIGNANT NEOPLASM OF PROSTATE (HCC): ICD-10-CM

## 2021-11-16 DIAGNOSIS — C61 PROSTATE CANCER (HCC): ICD-10-CM

## 2021-11-16 DIAGNOSIS — M35.3 POLYMYALGIA RHEUMATICA (HCC): ICD-10-CM

## 2021-11-16 DIAGNOSIS — E55.9 VITAMIN D DEFICIENCY DISEASE: ICD-10-CM

## 2021-11-16 DIAGNOSIS — I10 ESSENTIAL HYPERTENSION, MALIGNANT: ICD-10-CM

## 2021-11-16 PROCEDURE — 82570 ASSAY OF URINE CREATININE: CPT

## 2021-11-16 PROCEDURE — 83550 IRON BINDING TEST: CPT

## 2021-11-16 PROCEDURE — 82746 ASSAY OF FOLIC ACID SERUM: CPT

## 2021-11-16 PROCEDURE — 82607 VITAMIN B-12: CPT

## 2021-11-16 PROCEDURE — 83540 ASSAY OF IRON: CPT

## 2021-11-16 PROCEDURE — 82043 UR ALBUMIN QUANTITATIVE: CPT

## 2021-11-16 PROCEDURE — 84550 ASSAY OF BLOOD/URIC ACID: CPT

## 2021-11-16 PROCEDURE — 80053 COMPREHEN METABOLIC PANEL: CPT

## 2021-11-16 PROCEDURE — 83970 ASSAY OF PARATHORMONE: CPT

## 2021-11-16 PROCEDURE — 82728 ASSAY OF FERRITIN: CPT

## 2021-11-16 PROCEDURE — 85025 COMPLETE CBC W/AUTO DIFF WBC: CPT

## 2021-11-16 PROCEDURE — 84156 ASSAY OF PROTEIN URINE: CPT

## 2021-11-16 PROCEDURE — 82306 VITAMIN D 25 HYDROXY: CPT

## 2021-11-16 PROCEDURE — 81001 URINALYSIS AUTO W/SCOPE: CPT

## 2021-11-16 PROCEDURE — 36415 COLL VENOUS BLD VENIPUNCTURE: CPT

## 2021-11-16 PROCEDURE — 84100 ASSAY OF PHOSPHORUS: CPT

## 2021-11-29 ENCOUNTER — LAB ENCOUNTER (OUTPATIENT)
Dept: LAB | Age: 76
End: 2021-11-29
Attending: INTERNAL MEDICINE
Payer: MEDICARE

## 2021-11-29 DIAGNOSIS — C61 MALIGNANT NEOPLASM OF PROSTATE (HCC): ICD-10-CM

## 2021-11-29 DIAGNOSIS — E11.8 DIABETIC COMPLICATION (HCC): ICD-10-CM

## 2021-11-29 DIAGNOSIS — M35.3 POLYMYALGIA RHEUMATICA (HCC): ICD-10-CM

## 2021-11-29 DIAGNOSIS — E78.5 HYPERLIPIDEMIA LDL GOAL <70: ICD-10-CM

## 2021-11-29 DIAGNOSIS — E11.65 TYPE II DIABETES MELLITUS WITH HYPEROSMOLARITY, UNCONTROLLED (HCC): Primary | ICD-10-CM

## 2021-11-29 DIAGNOSIS — E87.1 HYPOSMOLALITY SYNDROME: ICD-10-CM

## 2021-11-29 DIAGNOSIS — C88.0 WALDENSTROM MACROGLOBULINEMIA (HCC): ICD-10-CM

## 2021-11-29 DIAGNOSIS — C61 PROSTATE CANCER (HCC): ICD-10-CM

## 2021-11-29 DIAGNOSIS — E03.8 SUBCLINICAL HYPOTHYROIDISM: ICD-10-CM

## 2021-11-29 DIAGNOSIS — E55.9 AVITAMINOSIS D: ICD-10-CM

## 2021-11-29 DIAGNOSIS — C88.0 MACROGLOBULINEMIA (HCC): ICD-10-CM

## 2021-11-29 DIAGNOSIS — E78.5 DYSLIPIDEMIA: ICD-10-CM

## 2021-11-29 DIAGNOSIS — E55.9 VITAMIN D DEFICIENCY DISEASE: ICD-10-CM

## 2021-11-29 DIAGNOSIS — I10 ESSENTIAL HYPERTENSION, MALIGNANT: ICD-10-CM

## 2021-11-29 DIAGNOSIS — E11.00 TYPE II DIABETES MELLITUS WITH HYPEROSMOLARITY, UNCONTROLLED (HCC): Primary | ICD-10-CM

## 2021-11-29 DIAGNOSIS — N18.4 CHRONIC KIDNEY DISEASE, STAGE IV (SEVERE) (HCC): ICD-10-CM

## 2021-11-29 PROCEDURE — 83540 ASSAY OF IRON: CPT

## 2021-11-29 PROCEDURE — 85025 COMPLETE CBC W/AUTO DIFF WBC: CPT

## 2021-11-29 PROCEDURE — 82043 UR ALBUMIN QUANTITATIVE: CPT

## 2021-11-29 PROCEDURE — 84550 ASSAY OF BLOOD/URIC ACID: CPT

## 2021-11-29 PROCEDURE — 84450 TRANSFERASE (AST) (SGOT): CPT

## 2021-11-29 PROCEDURE — 84443 ASSAY THYROID STIM HORMONE: CPT

## 2021-11-29 PROCEDURE — 82306 VITAMIN D 25 HYDROXY: CPT

## 2021-11-29 PROCEDURE — 82746 ASSAY OF FOLIC ACID SERUM: CPT

## 2021-11-29 PROCEDURE — 87086 URINE CULTURE/COLONY COUNT: CPT

## 2021-11-29 PROCEDURE — 36415 COLL VENOUS BLD VENIPUNCTURE: CPT

## 2021-11-29 PROCEDURE — 87077 CULTURE AEROBIC IDENTIFY: CPT

## 2021-11-29 PROCEDURE — 83550 IRON BINDING TEST: CPT

## 2021-11-29 PROCEDURE — 82607 VITAMIN B-12: CPT

## 2021-11-29 PROCEDURE — 84100 ASSAY OF PHOSPHORUS: CPT

## 2021-11-29 PROCEDURE — 83036 HEMOGLOBIN GLYCOSYLATED A1C: CPT

## 2021-11-29 PROCEDURE — 83970 ASSAY OF PARATHORMONE: CPT

## 2021-11-29 PROCEDURE — 84156 ASSAY OF PROTEIN URINE: CPT

## 2021-11-29 PROCEDURE — 82570 ASSAY OF URINE CREATININE: CPT

## 2021-11-29 PROCEDURE — 80061 LIPID PANEL: CPT

## 2021-11-29 PROCEDURE — 80053 COMPREHEN METABOLIC PANEL: CPT

## 2021-11-29 PROCEDURE — 82248 BILIRUBIN DIRECT: CPT

## 2021-11-29 PROCEDURE — 81001 URINALYSIS AUTO W/SCOPE: CPT

## 2021-11-29 PROCEDURE — 84460 ALANINE AMINO (ALT) (SGPT): CPT

## 2021-11-29 PROCEDURE — 82728 ASSAY OF FERRITIN: CPT

## 2021-12-02 ENCOUNTER — HOSPITAL ENCOUNTER (INPATIENT)
Facility: HOSPITAL | Age: 76
LOS: 6 days | Discharge: HOME HEALTH | DRG: 312 | End: 2021-12-10
Attending: EMERGENCY MEDICINE | Admitting: HOSPITALIST
Payer: MEDICARE

## 2021-12-02 DIAGNOSIS — R55 SYNCOPE AND COLLAPSE: Primary | ICD-10-CM

## 2021-12-02 PROBLEM — E87.1 HYPONATREMIA: Status: ACTIVE | Noted: 2021-12-02

## 2021-12-02 PROCEDURE — 99223 1ST HOSP IP/OBS HIGH 75: CPT | Performed by: INTERNAL MEDICINE

## 2021-12-02 RX ORDER — FINASTERIDE 5 MG/1
5 TABLET, FILM COATED ORAL DAILY
Status: DISCONTINUED | OUTPATIENT
Start: 2021-12-02 | End: 2021-12-10

## 2021-12-02 RX ORDER — LEVOTHYROXINE SODIUM 0.07 MG/1
75 TABLET ORAL
Status: DISCONTINUED | OUTPATIENT
Start: 2021-12-04 | End: 2021-12-10

## 2021-12-02 RX ORDER — HYDRALAZINE HYDROCHLORIDE 50 MG/1
100 TABLET, FILM COATED ORAL EVERY 8 HOURS SCHEDULED
Status: DISCONTINUED | OUTPATIENT
Start: 2021-12-02 | End: 2021-12-04

## 2021-12-02 RX ORDER — METOPROLOL TARTRATE 100 MG/1
100 TABLET ORAL
Status: DISCONTINUED | OUTPATIENT
Start: 2021-12-02 | End: 2021-12-10

## 2021-12-02 RX ORDER — LEVOTHYROXINE SODIUM 0.05 MG/1
50 TABLET ORAL
Status: DISCONTINUED | OUTPATIENT
Start: 2021-12-03 | End: 2021-12-10

## 2021-12-02 RX ORDER — INSULIN GLARGINE 300 U/ML
INJECTION, SOLUTION SUBCUTANEOUS NIGHTLY
COMMUNITY
Start: 2021-11-08

## 2021-12-02 RX ORDER — LOSARTAN POTASSIUM 25 MG/1
25 TABLET ORAL NIGHTLY
COMMUNITY

## 2021-12-02 RX ORDER — FOLIC ACID 1 MG/1
1 TABLET ORAL DAILY
Status: DISCONTINUED | OUTPATIENT
Start: 2021-12-02 | End: 2021-12-10

## 2021-12-02 RX ORDER — ONDANSETRON 2 MG/ML
4 INJECTION INTRAMUSCULAR; INTRAVENOUS EVERY 6 HOURS PRN
Status: DISCONTINUED | OUTPATIENT
Start: 2021-12-02 | End: 2021-12-10

## 2021-12-02 RX ORDER — ALLOPURINOL 100 MG/1
100 TABLET ORAL DAILY
Status: DISCONTINUED | OUTPATIENT
Start: 2021-12-02 | End: 2021-12-10

## 2021-12-02 RX ORDER — METOCLOPRAMIDE HYDROCHLORIDE 5 MG/ML
5 INJECTION INTRAMUSCULAR; INTRAVENOUS EVERY 8 HOURS PRN
Status: DISCONTINUED | OUTPATIENT
Start: 2021-12-02 | End: 2021-12-10

## 2021-12-02 RX ORDER — POTASSIUM CHLORIDE 20 MEQ/1
40 TABLET, EXTENDED RELEASE ORAL ONCE
Status: COMPLETED | OUTPATIENT
Start: 2021-12-02 | End: 2021-12-02

## 2021-12-02 RX ORDER — CHOLECALCIFEROL (VITAMIN D3) 125 MCG
2000 CAPSULE ORAL DAILY
Status: DISCONTINUED | OUTPATIENT
Start: 2021-12-02 | End: 2021-12-10

## 2021-12-02 RX ORDER — ATORVASTATIN CALCIUM 40 MG/1
40 TABLET, FILM COATED ORAL DAILY
Status: DISCONTINUED | OUTPATIENT
Start: 2021-12-02 | End: 2021-12-10

## 2021-12-02 RX ORDER — HEPARIN SODIUM 5000 [USP'U]/ML
5000 INJECTION, SOLUTION INTRAVENOUS; SUBCUTANEOUS EVERY 8 HOURS SCHEDULED
Status: DISCONTINUED | OUTPATIENT
Start: 2021-12-02 | End: 2021-12-08

## 2021-12-02 RX ORDER — DEXTROSE MONOHYDRATE 25 G/50ML
50 INJECTION, SOLUTION INTRAVENOUS
Status: DISCONTINUED | OUTPATIENT
Start: 2021-12-02 | End: 2021-12-10

## 2021-12-02 RX ORDER — MELATONIN
325
Status: DISCONTINUED | OUTPATIENT
Start: 2021-12-02 | End: 2021-12-05

## 2021-12-02 RX ORDER — ASPIRIN 81 MG/1
81 TABLET, CHEWABLE ORAL DAILY
Status: DISCONTINUED | OUTPATIENT
Start: 2021-12-02 | End: 2021-12-10

## 2021-12-02 RX ORDER — GABAPENTIN 300 MG/1
300 CAPSULE ORAL NIGHTLY
Status: DISCONTINUED | OUTPATIENT
Start: 2021-12-02 | End: 2021-12-10

## 2021-12-02 RX ORDER — AMLODIPINE BESYLATE 5 MG/1
5 TABLET ORAL DAILY
Status: DISCONTINUED | OUTPATIENT
Start: 2021-12-02 | End: 2021-12-10

## 2021-12-02 RX ORDER — ACETAMINOPHEN 325 MG/1
650 TABLET ORAL EVERY 6 HOURS PRN
Status: DISCONTINUED | OUTPATIENT
Start: 2021-12-02 | End: 2021-12-10

## 2021-12-02 RX ORDER — FOLIC ACID 1 MG/1
1 TABLET ORAL DAILY
COMMUNITY
Start: 2021-11-05

## 2021-12-02 RX ORDER — TAMSULOSIN HYDROCHLORIDE 0.4 MG/1
0.4 CAPSULE ORAL DAILY
Status: DISCONTINUED | OUTPATIENT
Start: 2021-12-02 | End: 2021-12-10

## 2021-12-02 RX ORDER — LIDOCAINE HYDROCHLORIDE 20 MG/ML
1 SOLUTION ORAL; TOPICAL DAILY
COMMUNITY
Start: 2021-09-08

## 2021-12-02 RX ORDER — TORSEMIDE 20 MG/1
40 TABLET ORAL DAILY
COMMUNITY

## 2021-12-02 NOTE — CONSULTS
[facility]  Report of Consultation    Lori Amin Patient Status:  Emergency    1945 MRN [id number]   Location [address] [facility] Attending Jorje Ruiz MD   Hosp Day # 0 PCP Rosales Monique MD [facility]     Reason fo stem 12/29/12    normal MRI and CT at THE [facility] for altered MS   • History of normal resting EKG 10/11/11, 4/3/17     LAD 17   • History of nuclear stress test 03/12/2019    nl with EF 68%   • Living will in place 12/6/2012     3/19/14   • Murmur     since ch Problem Relation Age of Onset   • Heart Disorder Brother    • Diabetes Neg    • Cancer Neg       reports that he quit smoking about 16 years ago. His smoking use included cigarettes. He has a 37.00 pack-year smoking history.  He has never used smokeless t 2000 UNITS Oral Cap, Take 2,000 Units by mouth daily. LOW-DOSE ASPIRIN 81 MG OR TABS, Take 81 mg by mouth daily. losartan 25 MG Oral Tab, Take 25 mg by mouth daily. Levothyroxine Sodium 75 MCG Oral Tab, Take 75 mcg by mouth twice a week.  Sat and sun 6.24 (H)   11/29/2021 6.01 (H)   11/16/2021 5.78 (H)   01/05/2021 1.85 (H)   07/14/2020 1.64 (H)   05/10/2019 1.98 (H)       Echo   2019  Summary:     1. Left ventricle:  The cavity size is normal. Wall thickness is normal.      Systolic function is normal. 6. Hx of Waldenstrom's - previously treated w/ rituximab  - as above    Thank you for allowing me to participate in this patient's care. Please feel free to call me with any questions or concerns.     Pasha Astudillo MD  12/2/2021  10:51 AM

## 2021-12-02 NOTE — ED QUICK NOTES
Orders for admission, patient is aware of plan and ready to go upstairs. Any questions, please call ED  [facility] at extension [phone].      Vaccinated:YES  Type of COVID test sent:RAPID  COVID Suspicion level: Low      Titratable drug(s) infusing: NA  Rate:

## 2021-12-02 NOTE — PLAN OF CARE
Received patient from ED. Alert, awake. Fatigued. Spouse providing most of the information. Per spouse patient still makes a lot of urine. U/a to be collected when available. Patient denies pain. Plan for permacath tomorrow. Npo after midnight for this.

## 2021-12-02 NOTE — ED PROVIDER NOTES
Patient Seen in: [facility] 0sw-a      History   Patient presents with:  Hypotension    Stated Complaint: HYPOTENSION    Subjective:   HPI    Patient is a 65-year-old male presents to emergency room for evaluation of a syncopal episode.   Patient lucien EKG 10/11/11, 4/3/17     LAD 17   • History of nuclear stress test 03/12/2019    nl with EF 68%   • Living will in place 12/6/2012     3/19/14   • Murmur     since childbirth   • Muscle weakness    • Neuropathy    • NHL (non-Hodgkin's lymphoma) ([facility] [address] 12/15 1.00        Years: 37.00        Pack years: 37        Types: Cigarettes        Quit date: 2005        Years since quittin.9      Smokeless tobacco: Never Used    Vaping Use      Vaping Use: Never used    Alcohol use: No      Alcohol/week: 0.0 rere components:       Result Value    Glucose 167 (*)     Sodium 135 (*)     Potassium 3.3 (*)      (*)     Creatinine 6.24 (*)     Calcium, Total 8.4 (*)     Calculated Osmolality 321 (*)     GFR, Non- 8 (*)     GFR, -American 9 8.9  EKG    Rate, intervals and axes as noted on EKG Report. Rate: 58  Rhythm: Sinus Rhythm  Reading: Sinus bradycardia. Bifascicular block which is old.   EKG unchanged from prior                       MDM      Patient is a 14-year-old male comes to [facility]

## 2021-12-02 NOTE — H&P
[facility] HOSPITALIST  History and Physical     Hitesh Pineda Patient Status:  Emergency    1945 MRN [id number]   Location [address] [facility] Attending Ese Quezada MD   Hosp Day # 0 PCP Lucia Grant MD [facility]     Chief (non-Hodgkin's lymphoma) ([facility] [address] 12/15/2010    Dr. Pardeep Scales ( last visit 12/17)  at [facility], treated with rituxan, now in remission   • Osteoarthritis    • Personal history of antineoplastic chemotherapy    • Presbyacusis 3/19/2014    hearing aides bilat   • Prostate Relation Age of Onset   • Heart Disorder Brother    • Diabetes Neg    • Cancer Neg         Allergies: No Known Allergies    Medications:  No current facility-administered medications on file prior to encounter.   TOUJEO MAX SOLOSTAR 300 UNIT/ML Subcutaneous 75 mL, Rfl: 1  Vitamin D3 2000 UNITS Oral Cap, Take 2,000 Units by mouth daily. , Disp: 30 capsule, Rfl: 0  LOW-DOSE ASPIRIN 81 MG OR TABS, Take 81 mg by mouth daily. , Disp: , Rfl:   losartan 25 MG Oral Tab, Take 25 mg by mouth daily. , Disp: , Rfl:   Levoth mL/min (A) (based on SCr of 6.24 mg/dL (H)). No results for input(s): PTP, INR in the last 168 hours.     COVID-19 Lab Results    COVID-19  Lab Results   Component Value Date    COVID19 Not Detected 12/02/2021    COVID19 Not Detected 08/27/2021    COVID1

## 2021-12-02 NOTE — ED INITIAL ASSESSMENT (HPI)
Pt to ED via EMS. Per EMS they were called for a fall. Once on scene patient did not fall, he started to fall and then family caught him. Orthostatic vitals done on scene and patient was 100/50 sitting and 70/45 standing. Pt arrives to ED with /53.

## 2021-12-03 ENCOUNTER — APPOINTMENT (OUTPATIENT)
Dept: INTERVENTIONAL RADIOLOGY/VASCULAR | Facility: HOSPITAL | Age: 76
DRG: 312 | End: 2021-12-03
Attending: INTERNAL MEDICINE
Payer: MEDICARE

## 2021-12-03 PROCEDURE — 99233 SBSQ HOSP IP/OBS HIGH 50: CPT | Performed by: INTERNAL MEDICINE

## 2021-12-03 PROCEDURE — 5A1D70Z PERFORMANCE OF URINARY FILTRATION, INTERMITTENT, LESS THAN 6 HOURS PER DAY: ICD-10-PCS | Performed by: RADIOLOGY

## 2021-12-03 PROCEDURE — 0JH63XZ INSERTION OF TUNNELED VASCULAR ACCESS DEVICE INTO CHEST SUBCUTANEOUS TISSUE AND FASCIA, PERCUTANEOUS APPROACH: ICD-10-PCS | Performed by: RADIOLOGY

## 2021-12-03 PROCEDURE — 99232 SBSQ HOSP IP/OBS MODERATE 35: CPT | Performed by: INTERNAL MEDICINE

## 2021-12-03 RX ORDER — FLUCONAZOLE 100 MG/1
100 TABLET ORAL SEE ADMIN INSTRUCTIONS
Status: DISCONTINUED | OUTPATIENT
Start: 2021-12-03 | End: 2021-12-06

## 2021-12-03 RX ORDER — HEPARIN SODIUM 5000 [USP'U]/ML
INJECTION, SOLUTION INTRAVENOUS; SUBCUTANEOUS
Status: COMPLETED
Start: 2021-12-03 | End: 2021-12-03

## 2021-12-03 RX ORDER — DEXTROSE MONOHYDRATE 100 MG/ML
INJECTION, SOLUTION INTRAVENOUS CONTINUOUS
Status: DISCONTINUED | OUTPATIENT
Start: 2021-12-03 | End: 2021-12-03

## 2021-12-03 RX ORDER — HEPARIN SODIUM 1000 [USP'U]/ML
1.5 INJECTION, SOLUTION INTRAVENOUS; SUBCUTANEOUS ONCE
Status: COMPLETED | OUTPATIENT
Start: 2021-12-03 | End: 2021-12-03

## 2021-12-03 RX ORDER — ALBUMIN (HUMAN) 12.5 G/50ML
100 SOLUTION INTRAVENOUS AS NEEDED
Status: DISCONTINUED | OUTPATIENT
Start: 2021-12-03 | End: 2021-12-10

## 2021-12-03 RX ORDER — MIDAZOLAM HYDROCHLORIDE 1 MG/ML
INJECTION INTRAMUSCULAR; INTRAVENOUS
Status: COMPLETED
Start: 2021-12-03 | End: 2021-12-03

## 2021-12-03 RX ORDER — ACETAMINOPHEN AND CODEINE PHOSPHATE 300; 30 MG/1; MG/1
1 TABLET ORAL EVERY 4 HOURS PRN
Status: DISCONTINUED | OUTPATIENT
Start: 2021-12-03 | End: 2021-12-10

## 2021-12-03 RX ORDER — CEFAZOLIN SODIUM 1 G/3ML
INJECTION, POWDER, FOR SOLUTION INTRAMUSCULAR; INTRAVENOUS
Status: COMPLETED
Start: 2021-12-03 | End: 2021-12-03

## 2021-12-03 RX ORDER — LIDOCAINE HYDROCHLORIDE 10 MG/ML
INJECTION, SOLUTION INFILTRATION; PERINEURAL
Status: COMPLETED
Start: 2021-12-03 | End: 2021-12-03

## 2021-12-03 RX ORDER — CEFAZOLIN SODIUM 1 G/3ML
INJECTION, POWDER, FOR SOLUTION INTRAMUSCULAR; INTRAVENOUS
Status: DISCONTINUED
Start: 2021-12-03 | End: 2021-12-03 | Stop reason: WASHOUT

## 2021-12-03 RX ORDER — LIDOCAINE HYDROCHLORIDE AND EPINEPHRINE 10; 10 MG/ML; UG/ML
INJECTION, SOLUTION INFILTRATION; PERINEURAL
Status: COMPLETED
Start: 2021-12-03 | End: 2021-12-03

## 2021-12-03 NOTE — CONSULTS
[facility] HEMATOLOGY ONCOLOGY  Report of Consultation    Gracy Bacon Patient Status:  Observation    1945 MRN [id number]   [facility] 0SW-A Attending Moris Del Toro MD   Hosp Day # 0 PCP MERVIN TIJERINA for altered MS   • History of normal resting EKG 10/11/11, 4/3/17     LAD 17   • History of nuclear stress test 03/12/2019    nl with EF 68%   • Living will in place 12/6/2012     3/19/14   • Murmur     since childbirth   • Muscle weakness    • Neuropathy Disorder Brother    • Diabetes Neg    • Cancer Neg        SOCIAL HX   reports that he quit smoking about 16 years ago. His smoking use included cigarettes. He has a 37.00 pack-year smoking history.  He has never used smokeless tobacco. He reports that he do 5000 UNIT/ML injection 5,000 Units, 5,000 Units, Subcutaneous, Q8H [facility]  •  acetaminophen (TYLENOL) tab 650 mg, 650 mg, Oral, Q6H PRN  •  ondansetron (ZOFRAN) injection 4 mg, 4 mg, Intravenous, Q6H PRN  •  metoclopramide (REGLAN) injection 5 mg, 5 mg, Intra (H) 0.70 - 1.30 mg/dL    Calcium, Total 8.4 (L) 8.5 - 10.1 mg/dL    Calculated Osmolality 321 (H) 275 - 295 mOsm/kg    GFR, Non- 8 (L) >=60    GFR, -American 9 (L) >=60    AST 11 (L) 15 - 37 U/L    ALT 18 16 - 61 U/L    Alkaline Phos 1. 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       We will follow as needed         Thank you for allowing me to participate in the care of your patient.     Chaz Farris MD

## 2021-12-03 NOTE — CM/SW NOTE
OP HD order noted. I have spoken with patients wife numerous times throughout the day today. I have also spoken with patiens nephrologist Dr Anitha Wheeler. [facility]   Flow sheets and hepatitis labs will need to be sent

## 2021-12-03 NOTE — PLAN OF CARE
Assumed care of pt at [facility]. Pt c/o pain to right side of mouth. Medicated with tylenol. Pt npo post mn for hd cath placement today. Pt with hypogylcemic episodes  x2 throughout the noc and given an amp of D50 each time with improvement of Blood sugar.

## 2021-12-03 NOTE — PHYSICAL THERAPY NOTE
PHYSICAL THERAPY EVALUATION - INPATIENT     Room Number: 0013/0013-A  Evaluation Date: 12/3/2021  Type of Evaluation: Initial  Physician Order: PT Eval and Treat    Presenting Problem: Syncope and collapsed  Co-Morbidities : DM, needs placement for H exam ([facility] [address] 9/20/17, 12/11, 1/13, 3/14, 3/17/15, 8/31/16, 3/8/17    Dr. Anuj Pradhan   • Disorder of thyroid    • ED (erectile dysfunction)    • Essential tremor 2017   • Gout    • Hearing impairment    • High blood pressure    • High cholesterol    • History of M Procedure: COLONOSCOPY, POSSIBLE BIOPSY, POSSIBLE POLYPECTOMY 01473;  Surgeon: London Newman MD;  Location: [address] [place] [address]   • COLONOSCOPY,DIAGNOSTIC  2006    Dr. Brandon Abbott normal   • OTHER SURGICAL HISTORY  5-6-10    PNBx, dr Maryan Mabry   • OTHER SURGICAL HIST lying on back to sitting on the side of the bed?: None   How much help from another person does the patient currently need. ..    Help from Another: Moving to and from a bed to a chair (including a wheelchair)?: A Little   Help from Another: Need to walk in him at home with limited mobilities form the past 2 wks and initiated RW use. In this PT evaluation, the patient presents with the following impairments: overall decrease functional strengths and task tolerance and is a high risk of fall.  Requires increase

## 2021-12-03 NOTE — PROGRESS NOTES
[facility]     Hospitalist Progress Note     Mohan Martinez Patient Status:  Observation    1945 MRN [id number]   [facility] 0SW-A Attending Will Mohamud MD   Hosp Day # 0 PCP Shashi Pulido  [facility]     Chief Complaint: COVID19 Not Detected 08/27/2021    COVID19 Not Detected 08/05/2021       Pro-Calcitonin  No results for input(s): PCT in the last 168 hours.     Cardiac  Recent Labs   Lab 12/02/21  0816   TROP <0.045       Creatinine Kinase  No results for input(s): CK in Prophylaxis: HSQ  · CODE status: full  · Baez: none  · Central line: none  · If COVID testing is negative, may discontinue isolation: yes     Will the patient be referred to T[facility] on discharge?: no  Estimated date of discharge: tbd  Discharge is dependent o

## 2021-12-03 NOTE — OCCUPATIONAL THERAPY NOTE
OT order received, chart reviewed, pt and family requesting OT to return later today as pt just finished PT and wants to rest, spoke with RN, pt to have perma-cath placed this PM and the HD, OT will follow up as schedule permits and pt is able.

## 2021-12-03 NOTE — CONSULTS
[facility]  Progress Note    Opal Hammond Patient Status:  Emergency    1945 MRN [id number]   Location [address] [facility] Attending Diogo Finney MD   Hosp Day # 0 PCP MERVIN CHOUDHARY     No acute events PRN  metoclopramide (REGLAN) injection 5 mg, 5 mg, Intravenous, Q8H PRN  Insulin Aspart Pen (NOVOLOG) 100 UNIT/ML flexpen 1-10 Units, 1-10 Units, Subcutaneous, TID AC and HS          Review of Systems:  See HPI; A total of 12 systems reviewed and otherwise is mild regurgitation. 4. Right ventricle: Estimation of the right ventricular systolic pressure is      mildly increased. RV systolic pressure (S, est): 37mm Hg. 5. Tricuspid valve: There is mild regurgitation.       Imaging:  Reviewed    Impression:

## 2021-12-04 PROCEDURE — 99232 SBSQ HOSP IP/OBS MODERATE 35: CPT | Performed by: INTERNAL MEDICINE

## 2021-12-04 PROCEDURE — 99233 SBSQ HOSP IP/OBS HIGH 50: CPT | Performed by: INTERNAL MEDICINE

## 2021-12-04 RX ORDER — LOSARTAN POTASSIUM 50 MG/1
50 TABLET ORAL DAILY
Status: DISCONTINUED | OUTPATIENT
Start: 2021-12-05 | End: 2021-12-05

## 2021-12-04 RX ORDER — HEPARIN SODIUM 1000 [USP'U]/ML
3000 INJECTION, SOLUTION INTRAVENOUS; SUBCUTANEOUS
Status: DISCONTINUED | OUTPATIENT
Start: 2021-12-04 | End: 2021-12-10

## 2021-12-04 RX ORDER — BENZONATATE 100 MG/1
100 CAPSULE ORAL 3 TIMES DAILY PRN
Status: DISCONTINUED | OUTPATIENT
Start: 2021-12-04 | End: 2021-12-10

## 2021-12-04 NOTE — PLAN OF CARE
Patient was transferred from IR to room 8622 via bed. Patient vials have remained stable since arrival and during HD.    N: AOx4 with some forgetfulness  R: CTA bilaterally. Room air. C: NSR w/BBB on tele. GI: Staff reports his last BM was 12/2/21.   : Monitor response to interventions for patient's volume status, including labs, urine output, blood pressure (other measures as available)  - Encourage oral intake as appropriate  - Instruct patient on fluid and nutrition restrictions as appropriate  Outcom

## 2021-12-04 NOTE — PROGRESS NOTES
[facility]     Hospitalist Progress Note     Opal Hammond Patient Status:  Observation    1945 MRN [id number]   [facility] 0SW-A Attending Subhash Lei MD   Hosp Day # 0 PCP Mauricio Hall  [facility]     Chief Complaint: this interval not displayed. Estimated Creatinine Clearance: 12.1 mL/min (A) (based on SCr of 4.13 mg/dL (H)).     Recent Labs   Lab 12/02/21  1308   PTP 13.6   INR 1.02            COVID-19 Lab Results    COVID-19  Lab Results   Component Value Date canal  2. Pain control  3. Outpatient follow up  5. Dyslipidemia  6. Hypokalemia  a. replace  7. DM  a. SSI  8.   Hx of Waldenstrom's      Plan of care: continue current management    Plan of care discussed with patient, RN    Erlinda Denson MD    Supplem

## 2021-12-04 NOTE — PROGRESS NOTES
Paged Dr. Graham Divers regarding the following:  Pt had a root canal a few months ago and the patient and family suspect that he has another tooth infected because he is c/o severe lower jaw tooth pain. Pt had new tunneled HD cath placed today.

## 2021-12-04 NOTE — PROGRESS NOTES
[facility]  Nephrology Progress Note    Rodriguez Myers Attending:  Susan Villar MD       Assessment and Plan:    1) ESRD- progressive diabetic and hypertensive nephropathy now with eGFR < 10 ml/min with uremic sx; tolerated HD well yester 12/04/2021    MG 2.0 12/04/2021    PGLU 141 12/04/2021       Imaging: All imaging studies reviewed.     Meds:   insulin detemir (LEVEMIR) 100 UNIT/ML flextouch 4 Units, 4 Units, Subcutaneous, Nightly  Albumin Human (ALBUMINAR) 25 % solution 100 mL, 100 mL, PRN  metoclopramide (REGLAN) injection 5 mg, 5 mg, Intravenous, Q8H PRN  Insulin Aspart Pen (NOVOLOG) 100 UNIT/ML flexpen 1-10 Units, 1-10 Units, Subcutaneous, TID AC and HS          Questions/concerns were discussed with patient and/or family by bedside.

## 2021-12-04 NOTE — PROGRESS NOTES
[facility] Pharmacy Note:  Renal Adjustment for fluconazole (DIFLUCAN)    Nabiljeremyfelicita Henriquez is a 67year old patient who has been prescribed fluconazole (DIFLUCAN) 100 mg every 24 hrs. The estimated creatinine clearance is 8.1 mL/min (A) (based on SCr of 6.

## 2021-12-04 NOTE — PLAN OF CARE
Pt is A+Ox4. Deaf in R ear. HA in L ear. VSS on RA. Nsr on tele. Subcutaneous Heparin. Voids in bathroom- BM today. Diminished urine production. HD today. Tolerating diet. Hypoglycemic this AM and afternoon- MD made aware. Pt is up with 1A and walker.  Pt a and optimal renal function maintained  Description: INTERVENTIONS:  - Monitor labs and assess for signs and symptoms of volume excess or deficit  - Monitor intake, output and patient weight  - Monitor urine specific gravity, serum osmolarity and serum sodi

## 2021-12-04 NOTE — PLAN OF CARE
Pt is A&Ox4. Wife at bedside. RA, lungs clear. NSR on tele, denies cardiovascular symptoms. RIJ catheter CDI. Tolerated HD well tonight. [facility] notified of HD for tomorrow 12/4. Continent B&B, LBM 12/3. Diminished urine output.  Started on fluconazole fo and repeat lab results as appropriate  - Fluid restriction as ordered  - Instruct patient on fluid and nutrition restrictions as appropriate  Outcome: Progressing  Goal: Hemodynamic stability and optimal renal function maintained  Description: INTERVENTION

## 2021-12-05 ENCOUNTER — APPOINTMENT (OUTPATIENT)
Dept: GENERAL RADIOLOGY | Facility: HOSPITAL | Age: 76
DRG: 312 | End: 2021-12-05
Attending: INTERNAL MEDICINE
Payer: MEDICARE

## 2021-12-05 ENCOUNTER — APPOINTMENT (OUTPATIENT)
Dept: CT IMAGING | Facility: HOSPITAL | Age: 76
DRG: 312 | End: 2021-12-05
Attending: INTERNAL MEDICINE
Payer: MEDICARE

## 2021-12-05 PROCEDURE — 70450 CT HEAD/BRAIN W/O DYE: CPT | Performed by: INTERNAL MEDICINE

## 2021-12-05 PROCEDURE — 99232 SBSQ HOSP IP/OBS MODERATE 35: CPT | Performed by: INTERNAL MEDICINE

## 2021-12-05 PROCEDURE — 99233 SBSQ HOSP IP/OBS HIGH 50: CPT | Performed by: INTERNAL MEDICINE

## 2021-12-05 PROCEDURE — 71045 X-RAY EXAM CHEST 1 VIEW: CPT | Performed by: INTERNAL MEDICINE

## 2021-12-05 RX ORDER — TORSEMIDE 20 MG/1
40 TABLET ORAL DAILY
Status: DISCONTINUED | OUTPATIENT
Start: 2021-12-05 | End: 2021-12-10

## 2021-12-05 RX ORDER — ACETAMINOPHEN 10 MG/ML
1000 INJECTION, SOLUTION INTRAVENOUS EVERY 6 HOURS PRN
Status: DISCONTINUED | OUTPATIENT
Start: 2021-12-05 | End: 2021-12-10

## 2021-12-05 RX ORDER — HYDRALAZINE HYDROCHLORIDE 20 MG/ML
5 INJECTION INTRAMUSCULAR; INTRAVENOUS EVERY 4 HOURS PRN
Status: DISCONTINUED | OUTPATIENT
Start: 2021-12-05 | End: 2021-12-10

## 2021-12-05 RX ORDER — ACETAMINOPHEN 650 MG/1
650 SUPPOSITORY RECTAL EVERY 6 HOURS PRN
Status: DISCONTINUED | OUTPATIENT
Start: 2021-12-05 | End: 2021-12-10

## 2021-12-05 RX ORDER — LOSARTAN POTASSIUM 100 MG/1
100 TABLET ORAL DAILY
Status: DISCONTINUED | OUTPATIENT
Start: 2021-12-06 | End: 2021-12-10

## 2021-12-05 NOTE — SLP NOTE
Order received for bedside swallow evaluation however per discussion with RN and family pt is poorly responsive and not safe for po trials at this time. Will re-attempt when appropriate and available.     Chely Albarran MA, [address] [facility]  Pager

## 2021-12-05 NOTE — PLAN OF CARE
Received patient at 730. Unresponsive and elevated BP.  02 saturation 97% on 2L   Breath sounds crackles. Bed is locked and in low position. Call light and personal item within reach, family at bedside.   No complaint of chest pain or shortness of breat INTERVENTIONS:  - Monitor labs and assess for signs and symptoms of volume excess or deficit  - Monitor intake, output and patient weight  - Monitor urine specific gravity, serum osmolarity and serum sodium as indicated or ordered  - Monitor response to in

## 2021-12-05 NOTE — CONSULTS
Pulmonary H&P/Consult       NAME: Barbara Martinez - ROOM: [facility]5967-O - MRN: [id number] - Age: 67year old - :  1945    Date of Admission: 2021  8:02 AM  Admission Diagnosis: Syncope and collapse [R55]  Reason for consult: PNA    History • Neuropathy    • NHL (non-Hodgkin's lymphoma) ([facility] [address] 12/15/2010    Dr. Pardeep Valenzuela ( last visit 12/17)  at [facility], treated with rituxan, now in remission   • Osteoarthritis    • Personal history of antineoplastic chemotherapy    • Presbyacusis 3/19/2014    hearing mouth daily. , Disp: , Rfl: , 12/1/2021 at Unknown time  folic acid 1 MG Oral Tab, Take 1 mg by mouth daily. , Disp: , Rfl: , 12/1/2021 at Unknown time  atorvastatin 40 MG Oral Tab, Take 1 tablet (40 mg total) by mouth daily. , Disp: 30 tablet, Rfl: 1, 12/1/2 time  LOW-DOSE ASPIRIN 81 MG OR TABS, Take 81 mg by mouth daily. , Disp: , Rfl: , 12/1/2021 at Unknown time  losartan 25 MG Oral Tab, Take 25 mg by mouth daily. , Disp: , Rfl:   Levothyroxine Sodium 75 MCG Oral Tab, Take 75 mcg by mouth twice a week.  Sat and Heart Disorder Brother    • Diabetes Neg    • Cancer Neg         Home Medications:  TOUJEO MAX SOLOSTAR 300 UNIT/ML Subcutaneous Solution Pen-injector, Inject 8-10 Units into the skin at bedtime. , Disp: , Rfl:   torsemide 20 MG Oral Tab, Take 40 mg by mout MG OR TABS, Take 81 mg by mouth daily. , Disp: , Rfl:         Scheduled Medication:  • [START ON 12/6/2021] epoetin oneyda-epbx  10,000 Units Intravenous Once in dialysis   • piperacillin-tazobactam  3.375 g Intravenous 2 times per day   • [START ON 12/6/2021 (57.3 kg)  08/27/21 : 138 lb (62.6 kg)  08/07/21 : 143 lb 15.4 oz (65.3 kg)        Intake/Output Summary (Last 24 hours) at 12/5/2021 1412  Last data filed at 12/5/2021 1356  Gross per 24 hour   Intake 480 ml   Output 0 ml   Net 480 ml       /53 ([facility]  Pulmonary and Critical Care

## 2021-12-05 NOTE — PLAN OF CARE
Pt is A&Ox4. Fatigued. Wife at bedside. RA, c/o nonproductive dry cough throughout the day, new order for CXR, robitussin, tessalon pearls prn. Hot tea & honey given. Mild nausea, zofran given. NSR on tele, denies CP/dizziness. Continent B&B, LBM 12/4.  Po Hemodynamic stability and optimal renal function maintained  Description: INTERVENTIONS:  - Monitor labs and assess for signs and symptoms of volume excess or deficit  - Monitor intake, output and patient weight  - Monitor urine specific gravity, serum osm

## 2021-12-05 NOTE — PROGRESS NOTES
[facility]     Hospitalist Progress Note     Lucien Roberts Patient Status:  Observation    1945 MRN [id number]   [facility] 0SW-A Attending Trace Covarrubias MD   Hosp Day # 1 PCP MERVIN TIJERINA [facility]     Chief Complaint: 83  --  79  --   --   --   --    AST 14*  14* 13*  --  11*  --   --   --   --    ALT 20  20 18  --  18  --   --   --   --    BILT 0.3 0.3  --  0.3  --   --   --   --    TP 7.7 7.4  --  7.9  --   --   --   --     < > = values in this interval not displayed. Hypotension  a. Monitor on telemetry  2. Acute on CKD vs progression to ESRD  a. Renal following  b. permath placement and started HD  c. Resume ARB  3. Ess HTN  a. ? Autonomic dysfunction given initially orthostatic and now hypertensive  b.  Hold parameter

## 2021-12-05 NOTE — PROGRESS NOTES
[facility] Pharmacy Note:  Renal Adjustment for piperacillin/tazobactam (Sabina Vaz)    Lizbeth Mike Morgan is a 67year old patient who has been prescribed piperacillin/tazobactam (ZOSYN) 3.375 g every 8 hrs.   The estimated creatinine clearance is 18.6 mL/min (A)

## 2021-12-05 NOTE — PROGRESS NOTES
[facility]  Nephrology Progress Note    Lori Swift Attending:  Art Santiago MD       Assessment and Plan:    1) ESRD- progressive diabetic and hypertensive nephropathy now with eGFR < 10 ml/min with uremic sx; tolerated HD well.  Is no Labs:   Lab Results   Component Value Date    CREATSERUM 2.69 12/05/2021    BUN 32 12/05/2021     12/05/2021    K 4.2 12/05/2021     12/05/2021    CO2 29.0 12/05/2021     12/05/2021    CA 8.3 12/05/2021    PGLU 171 12/05/2021       I Oral, Q15 Min PRN   Or  glucose-vitamin C (DEX-4) chewable tab 8 tablet, 8 tablet, Oral, Q15 Min PRN  Heparin Sodium (Porcine) 5000 UNIT/ML injection 5,000 Units, 5,000 Units, Subcutaneous, Q8H ATUL  acetaminophen (TYLENOL) tab 650 mg, 650 mg, Oral, Q6H PRN

## 2021-12-05 NOTE — PHYSICAL THERAPY NOTE
Patient's wife not in agreement with Physical therapy session. Pt was received in supine, frequently coughing, followed commands for RUE exercises but when didn't open his eyes. Attempted to orient pt.   Wife continued to state \" I don't want him to get up

## 2021-12-06 PROCEDURE — 99233 SBSQ HOSP IP/OBS HIGH 50: CPT | Performed by: INTERNAL MEDICINE

## 2021-12-06 PROCEDURE — 99232 SBSQ HOSP IP/OBS MODERATE 35: CPT | Performed by: INTERNAL MEDICINE

## 2021-12-06 RX ORDER — METOPROLOL TARTRATE 5 MG/5ML
5 INJECTION INTRAVENOUS EVERY 6 HOURS SCHEDULED
Status: DISCONTINUED | OUTPATIENT
Start: 2021-12-06 | End: 2021-12-09

## 2021-12-06 RX ORDER — FLUCONAZOLE 100 MG/1
100 TABLET ORAL
Status: DISCONTINUED | OUTPATIENT
Start: 2021-12-06 | End: 2021-12-07

## 2021-12-06 NOTE — PROGRESS NOTES
[facility]     Hospitalist Progress Note     Emery Chakraborty Patient Status:  Observation    1945 MRN [id number]   [facility] 0SW-A Attending Lamar Melendez MD   Hosp Day # 2 PCP MERVIN TIJERINA [address] [facility]     Chief Complaint: --   --   --    BILT 0.3  --  0.3  --   --   --   --    TP 7.4  --  7.9  --   --   --   --     < > = values in this interval not displayed. Estimated Creatinine Clearance: 10.8 mL/min (A) (based on SCr of 4.63 mg/dL (H)).     Recent Labs   Lab 12/02/2 HTN  a. ? Autonomic dysfunction given initially orthostatic and now hypertensive  b. Hold parameters on meds per renal  4. Pneumonia, possible aspiration  1. Started on abx  2. Speech/swallow eval  3. Follow cultures  4. Monitor fever curves  5.  Acute Ence

## 2021-12-06 NOTE — DIETARY NOTE
[facility]    NUTRITION ASSESSMENT    Pt does not meet malnutrition criteria.     NUTRITION INTERVENTION:    1. RD nutrition Care Plan- Adjusted diet to least restrictive to maximize intake and Recommend EN (enteral nutrition) support if unable to take oz)  12/06/19 : 67.7 kg (149 lb 3.2 oz)       NUTRITION:  Diet: Orders Placed This Encounter      NPO     Percent Meals Eaten (last 3 days)     Date/Time Percent Meals Eaten (%)    12/03/21 1100 0 %    12/04/21 1626 90 %    12/04/21 2100 0 %    12/05/21 13

## 2021-12-06 NOTE — PHYSICAL THERAPY NOTE
Attempted PT session, RN informed that the pt is very lethargic this am. He has HD this pm, will try around 11 am one more time to see if pt is more alert to be able to participate in therapy.  Pt will remain on follow up list.    Addendum: Wife declined PT

## 2021-12-06 NOTE — PROGRESS NOTES
[facility]     Nephrology Progress Note    Nella Mackay Patient Status:  Inpatient    1945 MRN [id number]   [facility] 8NE-A Attending Carly Galeana MD   Hosp Day # 2 PCP MERVIN Bee Job 3.7   < > 3.3* 4.3 4.2 4.2 5.0   CL 94*   < > 101 103 106 101 99   CO2 25.0   < > 22.0 25.0 27.0 29.0 26.0   *   < > 116* 113* 68* 32* 47*   CREATSERUM 6.01*   < > 6.24* 6.22* 4.13* 2.69* 4.63*   CA 8.2*   < > 8.4* 8.0* 8.3* 8.3* 8.5   MG  --   -- mg, 100 mg, Oral, Daily  atorvastatin (LIPITOR) tab 40 mg, 40 mg, Oral, Daily  finasteride (PROSCAR) tab 5 mg, 5 mg, Oral, Daily  folic acid (FOLVITE) tab 1 mg, 1 mg, Oral, Daily  gabapentin (NEURONTIN) cap 300 mg, 300 mg, Oral, Nightly  levothyroxine (SYN RLL infiltrate + leukocytosis and low grade fever- consider aspiration despite normal procalcitonin.  Starting zosyn; swallow study r/o aspiration        Discussed outpt HD process, transfer to [facility], etc  at length with pt's wife at [place] [id number]

## 2021-12-06 NOTE — PLAN OF CARE
Assumed care for patient @ 0700  Patient A/O x 0, lethargic, arousal with verbal and physical stimuli, requiring total assist with daily cares   Patient is on 2L of O2 per nasal cannula, oxygen saturations maintained; no evidence of shortness of breath  No patient for signs and symptoms of electrolyte imbalances  - Administer electrolyte replacement as ordered  - Monitor response to electrolyte replacements, including rhythm and repeat lab results as appropriate  - Fluid restriction as ordered  - Instruct pa

## 2021-12-06 NOTE — PLAN OF CARE
Pt is lethargic, responding to painful stimuli. 2L NC saturating 98%. Weak nonproductive cough, crackles bilaterally. IV zosyn started for pna. Temp 102.3, tylenol suppository & ice packs given. New order for Ofirimev PRN. Incontinent B&B, lbm 12/5.  Turn Q maintained  Description: INTERVENTIONS:  - Monitor labs and assess for signs and symptoms of volume excess or deficit  - Monitor intake, output and patient weight  - Monitor urine specific gravity, serum osmolarity and serum sodium as indicated or ordered

## 2021-12-06 NOTE — PROGRESS NOTES
Yasmine 71 Patient Status:  Inpatient    1945 MRN [id number]   [facility] 8NE-A Attending Brandon Gonzalez MD   Hosp Day # 2 PCP MERVIN CHOUDHARY     SUBJECTIVE: no new events.   Remains lethargic but Daily(Beta Blocker)  •  allopurinol (ZYLOPRIM) tab 100 mg, 100 mg, Oral, Daily  •  atorvastatin (LIPITOR) tab 40 mg, 40 mg, Oral, Daily  •  finasteride (PROSCAR) tab 5 mg, 5 mg, Oral, Daily  •  folic acid (FOLVITE) tab 1 mg, 1 mg, Oral, Daily  •  gabapenti .0 12/06/2021     Lab Results   Component Value Date     12/06/2021    K 5.0 12/06/2021    CL 99 12/06/2021    CO2 26.0 12/06/2021    BUN 47 12/06/2021    CREATSERUM 4.63 12/06/2021     12/06/2021    CA 8.5 12/06/2021     Lab Results

## 2021-12-06 NOTE — CM/SW NOTE
Spoke with patient's spouse to confirm preferred HD site--she chose [facility] as it is closest to their residence. Wife also expressed need for transportation assistance.       Spoke with zita from [facility]--updated her of [facility]  [phone]

## 2021-12-06 NOTE — OCCUPATIONAL THERAPY NOTE
OT order received, chart reviewed, pt and spouse refusing at this time stating her needs more time to rest and 2/2 lethargy, wife asked OT to return tomorrow.

## 2021-12-06 NOTE — SLP NOTE
Pt continues to be lethargic and inappropriate for safe po trials at this time. Discussed pt's current status and plan with spouse present bedside - good understanding reported. Will continue to follow and complete exam when appropriate and available.     J

## 2021-12-07 ENCOUNTER — APPOINTMENT (OUTPATIENT)
Dept: ULTRASOUND IMAGING | Facility: HOSPITAL | Age: 76
DRG: 312 | End: 2021-12-07
Attending: INTERNAL MEDICINE
Payer: MEDICARE

## 2021-12-07 PROCEDURE — 99232 SBSQ HOSP IP/OBS MODERATE 35: CPT | Performed by: INTERNAL MEDICINE

## 2021-12-07 PROCEDURE — 76705 ECHO EXAM OF ABDOMEN: CPT | Performed by: INTERNAL MEDICINE

## 2021-12-07 PROCEDURE — 99233 SBSQ HOSP IP/OBS HIGH 50: CPT | Performed by: INTERNAL MEDICINE

## 2021-12-07 RX ORDER — FLUCONAZOLE 100 MG/1
100 TABLET ORAL ONCE
Status: DISCONTINUED | OUTPATIENT
Start: 2021-12-07 | End: 2021-12-07

## 2021-12-07 RX ORDER — VANCOMYCIN HYDROCHLORIDE
25 ONCE
Status: COMPLETED | OUTPATIENT
Start: 2021-12-07 | End: 2021-12-07

## 2021-12-07 NOTE — SLP NOTE
ADULT SWALLOWING EVALUATION    ASSESSMENT    ASSESSMENT/OVERALL IMPRESSION:  Order received for bedside swallow evaluation to r/o aspiration. Pt presented to ER following syncope with collapse, weakness and falls.  Impression includes hypotension, worsening limited oral opening; unable to remove upper dentures at this time.  Oral care completed utilizing moistened toothette with some removal of thickened secretions obtained otherwise appears moist.  x1 spontaneous dry swallow palpated during oral care however vessel, native or graft     sees every 2 years, Dr. Eduardo Rosa, will schedule 2016   • Diabetes [facility])    • Diabetic eye exam ([facility] [address] 9/20/17, 12/11, 1/13, 3/14, 3/17/15, 8/31/16, 3/8/17    Dr. Roberto Anand   • Disorder of thyroid    • ED (erectile dysfunction)    • Marni airspace opacities in the right lower lobe region concerning for pneumonia and potential developing consolidation. 2. Mild bilateral pleural effusions, similar to prior imaging. 3.  Interval placement of a right IJ central venous dialysis catheter with [address] [facility]  Pager

## 2021-12-07 NOTE — PLAN OF CARE
Assumed care of pt @2330. Pt sleeping, moaning at times. Appears comfortable. Opens eyes to name and touch. Does not respond verbally. Pt has temp-tylenol being given prn, next due at 0400. Ice packs applied prn to armpits and neck.  Sinus rhythm,/sinus tac

## 2021-12-07 NOTE — OCCUPATIONAL THERAPY NOTE
OCCUPATIONAL THERAPY EVALUATION - INPATIENT     Room Number: 0024/6877-B  Evaluation Date: 12/7/2021  Type of Evaluation: Initial  Presenting Problem: syncope and collapse    Physician Order: IP Consult to Occupational Therapy  Reason for Therapy: ADL/IADL Maximum assistance  Sit to supine: Max A  Chair transfer: NT 2/2 decreased following commands and ability to advance feet    Education provided: Role of OT, Safety with ADL and transfers, Overview of identified deficits, Activity recommendations, DC Rec fall risk    COGNITION  Overall Cognitive Status:  Impaired    ASSESSMENTS    Upper Extremity:   ROM: within functional limits   Strength: is within functional limits   Coordination:  Gross motor: WNL  Fine motor: WNL  Sensation: Light touch:  intact    AM Patient/family PPE: Mask not worn by pt nor dtr

## 2021-12-07 NOTE — PROGRESS NOTES
[facility]     Hospitalist Progress Note     Amrik Baptiste Patient Status:  Observation    1945 MRN [id number]   [facility] 0SW-A Attending Yanni James MD   Hosp Day # 3 PCP MERVIN TIJERINA [address] [facility]     Chief Complaint: displayed. Estimated Creatinine Clearance: 10.5 mL/min (A) (based on SCr of 4.63 mg/dL (H)).     Recent Labs   Lab 12/02/21  1308   PTP 13.6   INR 1.02            COVID-19 Lab Results    COVID-19  Lab Results   Component Value Date    COVID19 Not Dete 1. Fall/Orthostatic Hypotension  a. Monitor on telemetry  2. Acute on CKD vs progression to ESRD  a. Renal following  b. permath placement and started HD  c. Resume ARB  3. Ess HTN  a. ?  Autonomic dysfunction given initially orthostatic and now hyperte

## 2021-12-07 NOTE — PROGRESS NOTES
[facility]     Nephrology Progress Note    Evelin Lewis Patient Status:  Inpatient    1945 MRN [id number]   [facility] 8NE-A Attending Tamiko Mckinney MD   Hosp Day # 3 PCP MERVIN CHOUDHARY       SUBJECTIVE:  + fev 75 mcg, 75 mcg, Oral, Once per day on Sun Sat  aspirin chewable tab 81 mg, 81 mg, Oral, Daily  tamsulosin (FLOMAX) cap 0.4 mg, 0.4 mg, Oral, Daily  Vitamin D3 (Cholecalciferol) tab 2,000 Units, 2,000 Units, Oral, Daily  glucose (DEX4) oral liquid 15 g, 15 cyanosis or edema.   Neurologic:  moving all extremities  Skin: Warm and dry, no rash    Recent Labs     12/05/21  0753 12/06/21  0535   WBC 11.1* 11.0   HGB 10.0* 9.0*   MCV 94.3 92.0   .0 211.0       Recent Labs     12/05/21  0417 12/06/21  0535

## 2021-12-07 NOTE — CM/SW NOTE
Received call from [facility]--patient has been accepted @ [facility]  [address]  [facility], [address] [facility]  Phone  [phone]  [facility]  [phone]    Schedule: Monday--Wednesday--Fridays  Chair time 1500  Tentative start date 15

## 2021-12-07 NOTE — PROGRESS NOTES
Pharmacy Dosing Service  Initial Pharmacokinetic Consult for Vancomycin Dosing          Aakash Casey is a 67year old male who is being treated for pneumonia. Pharmacy has been asked to dose vancomycin by Dr. Ferdinand Claire.     He has No Known All

## 2021-12-07 NOTE — PROGRESS NOTES
Yasmine 71 Patient Status:  Inpatient    1945 MRN [id number]   [facility] 8NE-A Attending Will Mohamud MD   Hosp Day # 3 PCP MERVIN CHOUDHARY     SUBJECTIVE:  febrile overnight to 103.7; today a Daily(Beta Blocker)  •  allopurinol (ZYLOPRIM) tab 100 mg, 100 mg, Oral, Daily  •  atorvastatin (LIPITOR) tab 40 mg, 40 mg, Oral, Daily  •  finasteride (PROSCAR) tab 5 mg, 5 mg, Oral, Daily  •  folic acid (FOLVITE) tab 1 mg, 1 mg, Oral, Daily  •  gabapenti consolidation     ASSESSMENT/PLAN:     1. Acute hypoxic resp failure- 2/2 PNA  - wean O2- on 1L currently  - bronchial hygiene  2.  PNA- large RLL  -HCAP/Aspiration- suspect the latter given location  -cont zosyn  -monitor cultures, adjust accordingly- so f

## 2021-12-08 PROCEDURE — 90935 HEMODIALYSIS ONE EVALUATION: CPT | Performed by: INTERNAL MEDICINE

## 2021-12-08 PROCEDURE — 99233 SBSQ HOSP IP/OBS HIGH 50: CPT | Performed by: HOSPITALIST

## 2021-12-08 RX ORDER — HEPARIN SODIUM 5000 [USP'U]/ML
5000 INJECTION, SOLUTION INTRAVENOUS; SUBCUTANEOUS EVERY 12 HOURS SCHEDULED
Status: DISCONTINUED | OUTPATIENT
Start: 2021-12-08 | End: 2021-12-09

## 2021-12-08 NOTE — PHYSICAL THERAPY NOTE
PHYSICAL THERAPY TREATMENT NOTE - INPATIENT    Room Number: 7139/6708-J     Session: 1   Number of Visits to Meet Established Goals: 7    Presenting Problem: Syncope and collapsed  Co-Morbidities : DM, needs placement for HD port . td     History related t type of vessel, native or graft     sees every 2 years, Dr. Teodoro Seymour, will schedule 2016   • Diabetes [facility])    • Diabetic eye exam ([facility] [address] 9/20/17, 12/11, 1/13, 3/14, 3/17/15, 8/31/16, 3/8/17    Dr. Trevor Gonzalez   • Disorder of thyroid    • ED (erectile dysfunction) POSSIBLE POLYPECTOMY 68719;  Surgeon: Sheliah Dandy, MD;  Location: [address] [place] [address]   • COLONOSCOPY N/A 2/12/2020    Procedure: COLONOSCOPY, POSSIBLE BIOPSY, POSSIBLE POLYPECTOMY 63683;  Surgeon: Sheliah Dandy, MD;  Location: [address] [place] [address] Assistance: Minimum assistance  Distance (ft): 5  Assistive Device: Rolling walker  Pattern: Shuffle    Skilled Therapy Provided: per RN pt ok to be seen. Pt received in bed and agreeable to therapy with encouragement. Pt daughter present during session. Recommendations: 24 hour care/supervision;Home with home health PT (recommended [facility]ELI but family refused)     PLAN  PT Treatment Plan: Bed mobility; Body mechanics; Endurance; Energy conservation;Patient education; Family education;Gait training;Strengthening;Tr

## 2021-12-08 NOTE — DIETARY NOTE
[facility]    NUTRITION ASSESSMENT    Pt does not meet malnutrition criteria.     NUTRITION INTERVENTION:    1. RD nutrition Care Plan- Adjusted diet to least restrictive to maximize intake and Recommend EN (enteral nutrition) support if unable to take oz)  01/18/21 : 70.1 kg (154 lb 8 oz)  01/05/21 : 69.9 kg (154 lb)  08/10/20 : 69.9 kg (154 lb)  07/07/20 : 71.7 kg (158 lb)  02/12/20 : 68.5 kg (151 lb)  01/08/20 : 70.9 kg (156 lb 3.2 oz)  12/06/19 : 67.7 kg (149 lb 3.2 oz)       NUTRITION:  Diet: Orders

## 2021-12-08 NOTE — PLAN OF CARE
RN SHIFT NOTE    Assumed care of pt at 0700. Pt denies pain at this time. Pt is alert and oriented  3 w/ forgetfulness. Reminders and reorientation completed. Patient is more awake and conversing with staff. Able to complete swallow evaluation and passed.

## 2021-12-08 NOTE — PROGRESS NOTES
Pulmonary Progress Note     Assessment / Plan:  1. Acute hypoxic respiratory failure - due to PNA  - wean supplemental O2 as able  2. PNA - concern for aspiration  - Zosyn  - IV vanc added due to fever while on Zosyn monotherapy. MRSA screen negative.  Cult

## 2021-12-08 NOTE — PLAN OF CARE
Pt Aox4, 1L, pain in back. Abdominal mass seen on mid left abdominal; US ordered. Bedside swallow given by user; apple sauce and thickened fluids given; no coughing. Worked with OT; stood with gait belt.      Problem: Diabetes/Glucose Control  Goal: Glucose frequent reorientation  - Promote wakefulness i.e. lights on, blinds open  - Promote sleep, encourage patient's normal rest cycle i.e. lights off, TV off, minimize noise and interruptions  - Encourage family to assist in orientation and promotion of home r

## 2021-12-08 NOTE — PLAN OF CARE
Assumed care of pt at 1. Pt now communicating with staff, follows simple commands. On 1L O2. Appears comfortable, no apparent distress noted. NSR on tele. NPO pending swallow eval. IV medications given only. Total assist. QID accucheck. Q2H turn.  Plan f duration of delirium  Description: Interventions:  - Encourage use of hearing aids, eye glasses  - Promote highest level of mobility daily  - Provide frequent reorientation  - Promote wakefulness i.e. lights on, blinds open  - Promote sleep, encourage robert

## 2021-12-08 NOTE — PROGRESS NOTES
[facility]     Nephrology Progress Note    Aakash Heman Patient Status:  Inpatient    1945 MRN [id number]   [facility] 8NE-A Attending Ellie Mcgill MD   Hosp Day # 4 PCP MERVIN CHOUDHARY       SUBJECTIVE:  No fe (LIPITOR) tab 40 mg, 40 mg, Oral, Daily  finasteride (PROSCAR) tab 5 mg, 5 mg, Oral, Daily  folic acid (FOLVITE) tab 1 mg, 1 mg, Oral, Daily  gabapentin (NEURONTIN) cap 300 mg, 300 mg, Oral, Nightly  levothyroxine (SYNTHROID) tab 50 mcg, 50 mcg, Oral, Once thyromegaly  Cardiac: Regular rate and rhythm, S1, S2 normal  Lungs- decr BS B, + rhonchi  Abdomen: Soft, non-tender. + bowel sounds, no palpable organomegaly  Extremities: Without clubbing, cyanosis or edema.   Neurologic:  moving all extremities  Skin: Wa

## 2021-12-08 NOTE — PROGRESS NOTES
[facility]     Hospitalist Progress Note     Wang Lee Patient Status:  Observation    1945 MRN [id number]   [facility] 0SW-A Attending Louisa Argueta MD   Hosp Day # 4 PCP MERVIN TIJERINA [address] [facility]     Chief Complaint: --   --   --     < > = values in this interval not displayed. Estimated Creatinine Clearance: 10.1 mL/min (A) (based on SCr of 4.84 mg/dL (H)).     Recent Labs   Lab 12/02/21  1308   PTP 13.6   INR 1.02            COVID-19 Lab Results    COVID-19  L per renal  3. Ess HTN  a. ? Autonomic dysfunction given initially orthostatic and now hypertensive  b. Hold parameters on meds per renal  4. Pneumonia, possible aspiration  1. Continue zosyn; added vanc per pharmacy  5. Tooth Pain  1. Needs root canal  6.

## 2021-12-08 NOTE — SLP NOTE
ADULT SWALLOWING EVALUATION    ASSESSMENT    ASSESSMENT/OVERALL IMPRESSION:  ASSESSMENT/OVERALL IMPRESSION:  Order received for bedside swallow evaluation to r/o aspiration. Pt presented to ER following syncope with collapse, weakness and falls.  Impression strategies/swallow precautions. Video swallow study to be completed if CXR declines, increase in clinical signs of aspiration, and/or MD desires. Collaborated with RN in regards to diet with agreement verbalized.      Following the evaluation, patient an Cataract    • Clinically significant macular edema 4/3/17    L eye Dr. Delvis Lo   • Coronary atherosclerosis of unspecified type of vessel, native or graft     sees every 2 years, Dr. Donny Wilde, will schedule 2016   • Diabetes [facility])    • Diabetic eye exam ([facility] [address] 9 drink    SWALLOWING HISTORY  Current Diet Consistency: NPO  Dysphagia History: No past history   Imaging Results:   Imaging Results:  1.  Extensive airspace opacities in the right lower lobe region concerning for pneumonia and potential developing [place] over 1-2 session(s). In Progress     FOLLOW UP  Treatment Plan/Recommendations: Dysphagia therapy; Aspiration precautions  Number of Visits to Meet Established Goals:  (1-2)  Follow Up Needed (Documentation Required): Yes  SLP Follow-up Date: 12/09/21    T

## 2021-12-09 PROCEDURE — 99233 SBSQ HOSP IP/OBS HIGH 50: CPT | Performed by: INTERNAL MEDICINE

## 2021-12-09 PROCEDURE — 99232 SBSQ HOSP IP/OBS MODERATE 35: CPT | Performed by: HOSPITALIST

## 2021-12-09 RX ORDER — GUAIFENESIN 600 MG
600 TABLET, EXTENDED RELEASE 12 HR ORAL 2 TIMES DAILY
Status: DISCONTINUED | OUTPATIENT
Start: 2021-12-09 | End: 2021-12-10

## 2021-12-09 NOTE — PROGRESS NOTES
[facility]     Nephrology Progress Note    Arianna Wheat Patient Status:  Inpatient    1945 MRN [id number]   [facility] 8NE-A Attending Mihai Bui MD   Hosp Day # 5 PCP MERVIN CHOUDHARY       SUBJECTIVE:  Care per day on Sun Sat  aspirin chewable tab 81 mg, 81 mg, Oral, Daily  tamsulosin (FLOMAX) cap 0.4 mg, 0.4 mg, Oral, Daily  Vitamin D3 (Cholecalciferol) tab 2,000 Units, 2,000 Units, Oral, Daily  glucose (DEX4) oral liquid 15 g, 15 g, Oral, Q15 Min PRN   Or 12/08/21  0447      K 4.0      CO2 28.0   BUN 43*   CREATSERUM 4.84*   CA 8.1*       No results for input(s): ALT, AST, ALB, AMYLASE, LIPASE, LDH in the last 72 hours.     Invalid input(s): ALPHOS, TBIL, DBIL, TPROT         Impression/Plan:

## 2021-12-09 NOTE — PLAN OF CARE
Alert and oriented x3. On 1L O2. Denies any chest pain. NSR on tele. Incontinent of bowel and bladder. Total assist. QID accucheck. Call light within reach. Fall precautions in place. Hourly and prn rounding.       Problem: Diabetes/Glucose Control  Goal: G frequent reorientation  - Promote wakefulness i.e. lights on, blinds open  - Promote sleep, encourage patient's normal rest cycle i.e. lights off, TV off, minimize noise and interruptions  - Encourage family to assist in orientation and promotion of home r

## 2021-12-09 NOTE — CM/SW NOTE
liliane notified pt will receive HD at hospital on Friday and will then DC Friday. liliane notified [facility] that pt will start services there on Monday. liliane left VM to spouse with confirmed dialysis schedule and offering hhc. Awaiting call back.  Melba Sosa refe

## 2021-12-09 NOTE — PLAN OF CARE
Pt AOx3, speaking Kyrgyz/english. Positional pain (back to bed). QID accucheck. 1L. NSR. VSS. Appetite increasing; protein shake provided. BM today, minimal urine output. HD called for tomorrow.     Problem: Diabetes/Glucose Control  Goal: Glucose maintained interventions for patient's volume status, including labs, urine output, blood pressure (other measures as available)  - Encourage oral intake as appropriate  - Instruct patient on fluid and nutrition restrictions as appropriate  Outcome: Progressing     P

## 2021-12-09 NOTE — PROGRESS NOTES
Yasmine 71 Patient Status:  Inpatient    1945 MRN [id number]   [facility] 8NE-A Attending Nisha Muro MD   Hosp Day # 5 PCP MERVIN Khoury Hung much better.   Alert and awake, eat Daily  •  metoprolol tartrate (LOPRESSOR) tab 100 mg, 100 mg, Oral, 2x Daily(Beta Blocker)  •  allopurinol (ZYLOPRIM) tab 100 mg, 100 mg, Oral, Daily  •  atorvastatin (LIPITOR) tab 40 mg, 40 mg, Oral, Daily  •  finasteride (PROSCAR) tab 5 mg, 5 mg, Oral, D PNA  - wean supplemental O2 as able  - mucinex to thin secretions  2. PNA - concern for aspiration  - Zosyn- transition to augmentin at time of discharge to complete ten day course of abx  - IV vanc added due to fever while on Zosyn monotherapy.  MRSA scree

## 2021-12-09 NOTE — PROGRESS NOTES
[facility]     Hospitalist Progress Note     Angeles Morrissey Patient Status:  Observation    1945 MRN [id number]   [facility] 0SW-A Attending Amy Pedraza MD   Hosp Day # 5 PCP MERVIN TIJERINA [address] [facility]     Chief Complaint: Creatinine Clearance: 9.9 mL/min (A) (based on SCr of 4.84 mg/dL (H)).     Recent Labs   Lab 12/02/21  1308   PTP 13.6   INR 1.02            COVID-19 Lab Results    COVID-19  Lab Results   Component Value Date    COVID19 Not Detected 12/02/2021    COVID19 N per pharmacy  5. Tooth Pain  1. Needs root canal  6. acute Encephalopathy; improving  7. Dyslipidemia  8. Hypokalemia  a. Replace prn  9. DM  a. 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  Abdominal wall induration; unclear if small hematomas, lipomas, r

## 2021-12-10 ENCOUNTER — PATIENT OUTREACH (OUTPATIENT)
Dept: CASE MANAGEMENT | Age: 76
End: 2021-12-10

## 2021-12-10 VITALS
SYSTOLIC BLOOD PRESSURE: 162 MMHG | BODY MASS INDEX: 18.38 KG/M2 | RESPIRATION RATE: 20 BRPM | HEIGHT: 68 IN | DIASTOLIC BLOOD PRESSURE: 64 MMHG | TEMPERATURE: 98 F | HEART RATE: 65 BPM | WEIGHT: 121.25 LBS | OXYGEN SATURATION: 91 %

## 2021-12-10 PROCEDURE — 90935 HEMODIALYSIS ONE EVALUATION: CPT | Performed by: INTERNAL MEDICINE

## 2021-12-10 PROCEDURE — 99239 HOSP IP/OBS DSCHRG MGMT >30: CPT | Performed by: HOSPITALIST

## 2021-12-10 RX ORDER — AMOXICILLIN AND CLAVULANATE POTASSIUM 500; 125 MG/1; MG/1
1 TABLET, FILM COATED ORAL 2 TIMES DAILY
Qty: 9 TABLET | Refills: 0 | Status: SHIPPED | OUTPATIENT
Start: 2021-12-10 | End: 2021-12-15

## 2021-12-10 NOTE — SLP NOTE
SPEECH DAILY NOTE - INPATIENT    ASSESSMENT & PLAN   ASSESSMENT  Pt seen for dysphagia tx to assess tolerance with recommended diet, ensure proper utilization of aspiration precautions and provide pt/family education.   Contacted patient at the bedside to a (1-2)    Session: 1 of 1    If you have any questions, please contact Ravindra Polanco, SLP

## 2021-12-10 NOTE — PROGRESS NOTES
[facility]     Nephrology Progress Note    Arianna Wheat Patient Status:  Inpatient    1945 MRN [id number]   [facility] 8NE-A Attending Mihai Bui MD   Hosp Day # 6 PCP MERVIN CHOUDHARY       SUBJECTIVE:    HD Thu Fri  levothyroxine tab 75 mcg, 75 mcg, Oral, Once per day on Sun Sat  aspirin chewable tab 81 mg, 81 mg, Oral, Daily  tamsulosin (FLOMAX) cap 0.4 mg, 0.4 mg, Oral, Daily  Vitamin D3 (Cholecalciferol) tab 2,000 Units, 2,000 Units, Oral, Daily  glucose ( MONO#, BASOS#, EOSIN#    Recent Labs     12/08/21  0447 12/10/21  0537    134*   K 4.0 3.4*    99   CO2 28.0 26.0   BUN 43* 39*   CREATSERUM 4.84* 5.27*   CA 8.1* 8.0*   MG  --  2.2       No results for input(s): ALT, AST, ALB, AMYLASE, LIPASE,

## 2021-12-10 NOTE — PHYSICAL THERAPY NOTE
Attempted to see pt for PT. He performed aimee le heel slides but then he refused to sit up. He had dialysis this am. Per RN, family wanted pt to have PT session prior to dc. Encouragement provided to the patient but pt continued to decline.  Phone call place

## 2021-12-10 NOTE — PLAN OF CARE
Alert and oriented x3. On RA. Denies any chest pain. NSR on tele. Continent of bowel and bladder. Up with 1 x walker. QID accucheck. Call light within reach. Fall precautions in place. Hourly and prn rounding.       Problem: Diabetes/Glucose Control  Goal: Provide frequent reorientation  - Promote wakefulness i.e. lights on, blinds open  - Promote sleep, encourage patient's normal rest cycle i.e. lights off, TV off, minimize noise and interruptions  - Encourage family to assist in orientation and promotion o

## 2021-12-10 NOTE — OCCUPATIONAL THERAPY NOTE
Attempted to see pt for OT. Pt refusing OOB activity, stating \"That's the doorway. Right now! \" Will re-attempt to see pt as appropriate and as able.

## 2021-12-10 NOTE — PLAN OF CARE
Pt AOx3, RA; saturating well on RA, no stated pain. Dialysis today. Continent of bowel and bladder. Up with 1 x walker. QID accucheck. Call light within reach. Fall precautions in place.      Problem: Diabetes/Glucose Control  Goal: Glucose maintained withi interventions for patient's volume status, including labs, urine output, blood pressure (other measures as available)  - Encourage oral intake as appropriate  - Instruct patient on fluid and nutrition restrictions as appropriate  Outcome: Progressing     P

## 2021-12-10 NOTE — DISCHARGE SUMMARY
[facility] [place] HOSPITALIST  DISCHARGE SUMMARY     Davon Crystal Patient Status:  Inpatient    1945 MRN [id number]   [facility] 8NE-A Attending Ana Luisa Batista MD   Hosp Day # 6 PCP Deric Red     Date of Admission: 2021 pending at Discharge:   · none    Consultants:  • Renal  • pulm  • Hem/onc    Discharge Medication List:     Discharge Medications      START taking these medications      Instructions Prescription details   amoxicillin clavulanate 500-125 MG Tabs  Commonl (three) times daily. Quantity: 270 tablet  Refills: 0     levothyroxine 50 MCG Tabs  Commonly known as: SYNTHROID      Take 50 mcg by mouth See Admin Instructions.  Five days a week Mon through Fri   Refills: 0     levothyroxine 75 MCG Tabs      Take 75 m 12/11/2021 - 1/10/2022            None          Vital signs:  Temp:  [98.2 °F (36.8 °C)] 98.2 °F (36.8 °C)  Resp:  [20] 20  BP: (162)/(64) 162/64    Physical Exam:    General: groggy  Respiratory: Clear to auscultation bilaterally. No wheezes. No rhonchi.

## 2021-12-10 NOTE — CM/SW NOTE
MSW sent referrals for home 02, however, CM reviewed chart and the patient does not have a qualifying dx for home 02. MSW informed RN and family. RN will contact Dr. Jas Aguirre.     Isabel Loera LCSW    ADDENDUM:  [facility]

## 2021-12-10 NOTE — PROGRESS NOTES
1st attempt DM apt request    Dr.Christopher Rogers  [facility]     Spoke to patients wife Winnie Ennis and she stated she would call to schedule apt.

## 2021-12-10 NOTE — PROGRESS NOTES
Yasmine 71 Patient Status:  Inpatient    1945 MRN [id number]   [facility] 8NE-A Attending Julee Munoz MD   Hosp Day # 6 PCP MERVIN CHOUDHARY     SUBJECTIVE: overall much better.  Still has weak cou amLODIPine (NORVASC) tab 5 mg, 5 mg, Oral, Daily  •  metoprolol tartrate (LOPRESSOR) tab 100 mg, 100 mg, Oral, 2x Daily(Beta Blocker)  •  allopurinol (ZYLOPRIM) tab 100 mg, 100 mg, Oral, Daily  •  atorvastatin (LIPITOR) tab 40 mg, 40 mg, Oral, Daily  •  fi 12/10/2021     Lab Results   Component Value Date    PT 13.9 12/30/2012    PT 13.1 12/30/2012    INR 1.02 12/02/2021    INR 1.08 08/05/2021    INR 0.87 (L) 03/09/2019         Assessment / Plan:  1.  Acute hypoxic respiratory failure - due to large RLL bacte

## 2021-12-10 NOTE — CM/SW NOTE
[facility]  [place], [address]  Phone: [phone]  Fax: [phone]     [facility] arranged for start of care Monday at 3pm.  Family notified.   DELIA attempted t

## 2021-12-10 NOTE — PROGRESS NOTES
Seen and examined;  A bit groggy at this time. Discharge later today when more awake, if doing well and tolerating diet.

## 2021-12-11 NOTE — PROGRESS NOTES
PT DISCHARGED HOME WITH FAMILY & OXYGEN. PRESCRIPTION FAXED IN. ALL BELONGINGS APPEAR TO BE WITH FAMILY.

## 2021-12-14 ENCOUNTER — TELEPHONE (OUTPATIENT)
Dept: CASE MANAGEMENT | Facility: HOSPITAL | Age: 76
End: 2021-12-14

## 2021-12-14 ENCOUNTER — TELEPHONE (OUTPATIENT)
Dept: NEPHROLOGY | Facility: CLINIC | Age: 76
End: 2021-12-14

## 2021-12-14 NOTE — PROGRESS NOTES
sw received call from spouse asking for ARACELI placement at [facility]. Spouse only interested in [facility] at this time. sw initiated referral to [facility] nap via [address]. Referral pending.  Spouse understands she will need to follow up wit

## 2021-12-14 NOTE — TELEPHONE ENCOUNTER
Pt's son Tremayne Molina [phone] called concerned regarding the low oxygen level after dialysis, Please call the son has questions and concerns.

## 2021-12-14 NOTE — PROGRESS NOTES
Received VM from patients wife Therman Collet asking if we can get the patient into a rehab    Returned wifes VM and informed her we do not assist with that but I could get her in contact with a SW for further assistance, transferred patient to  at [id number]

## 2022-01-01 ENCOUNTER — APPOINTMENT (OUTPATIENT)
Dept: CT IMAGING | Age: 77
DRG: 023 | End: 2022-01-01
Attending: PHYSICIAN ASSISTANT

## 2022-01-01 ENCOUNTER — APPOINTMENT (OUTPATIENT)
Dept: GENERAL RADIOLOGY | Age: 77
DRG: 023 | End: 2022-01-01

## 2022-01-01 ENCOUNTER — IMAGING SERVICES (OUTPATIENT)
Dept: OTHER | Age: 77
End: 2022-01-01

## 2022-01-01 ENCOUNTER — HOSPITAL ENCOUNTER (INPATIENT)
Age: 77
LOS: 2 days | DRG: 023 | End: 2022-05-18

## 2022-01-01 ENCOUNTER — ANESTHESIA EVENT (OUTPATIENT)
Dept: SURGERY | Age: 77
DRG: 023 | End: 2022-01-01

## 2022-01-01 ENCOUNTER — APPOINTMENT (OUTPATIENT)
Dept: NEUROLOGY | Age: 77
DRG: 023 | End: 2022-01-01
Attending: PSYCHIATRY & NEUROLOGY

## 2022-01-01 ENCOUNTER — APPOINTMENT (OUTPATIENT)
Dept: CT IMAGING | Age: 77
DRG: 023 | End: 2022-01-01

## 2022-01-01 ENCOUNTER — APPOINTMENT (OUTPATIENT)
Dept: GENERAL RADIOLOGY | Age: 77
DRG: 023 | End: 2022-01-01
Attending: SURGERY

## 2022-01-01 ENCOUNTER — APPOINTMENT (OUTPATIENT)
Dept: CT IMAGING | Age: 77
DRG: 023 | End: 2022-01-01
Attending: NURSE PRACTITIONER

## 2022-01-01 ENCOUNTER — ANESTHESIA (OUTPATIENT)
Dept: SURGERY | Age: 77
DRG: 023 | End: 2022-01-01

## 2022-01-01 ENCOUNTER — APPOINTMENT (OUTPATIENT)
Dept: CARDIOLOGY | Age: 77
DRG: 023 | End: 2022-01-01
Attending: PSYCHIATRY & NEUROLOGY

## 2022-01-01 ENCOUNTER — APPOINTMENT (OUTPATIENT)
Dept: MRI IMAGING | Age: 77
DRG: 023 | End: 2022-01-01
Attending: PHYSICIAN ASSISTANT

## 2022-01-01 VITALS
HEART RATE: 63 BPM | DIASTOLIC BLOOD PRESSURE: 47 MMHG | SYSTOLIC BLOOD PRESSURE: 99 MMHG | RESPIRATION RATE: 16 BRPM | BODY MASS INDEX: 19.36 KG/M2 | OXYGEN SATURATION: 97 % | TEMPERATURE: 95.9 F | HEIGHT: 69 IN | WEIGHT: 130.73 LBS

## 2022-01-01 DIAGNOSIS — S06.5XAA SUBDURAL HEMATOMA (CMD): Primary | ICD-10-CM

## 2022-01-01 DIAGNOSIS — I62.00 SUBDURAL BLEEDING (CMD): ICD-10-CM

## 2022-01-01 LAB
ABO + RH BLD: NORMAL
ALBUMIN SERPL-MCNC: 2.4 G/DL (ref 3.6–5.1)
ALBUMIN/GLOB SERPL: 0.4 {RATIO} (ref 1–2.4)
ALP SERPL-CCNC: 85 UNITS/L (ref 45–117)
ALT SERPL-CCNC: 32 UNITS/L
ANION GAP SERPL CALC-SCNC: 15 MMOL/L (ref 10–20)
ANION GAP SERPL CALC-SCNC: 19 MMOL/L (ref 10–20)
ANION GAP SERPL CALC-SCNC: 20 MMOL/L (ref 10–20)
AORTIC VALVE AREA: NORMAL
APTT PPP: 23 SEC (ref 22–30)
APTT PPP: 24 SEC (ref 22–30)
ASCENDING AORTA (AAD): 2.1
AST SERPL-CCNC: 26 UNITS/L
ATRIAL RATE (BPM): 81
AV MEAN GRADIENT (AVMG): 9
AV MEAN VELOCITY (AVMV): 1.4
AV PEAK GRADIENT (AVPG): 16
AV PEAK VELOCITY (AVPV): 1.99
AV STENOSIS SEVERITY TEXT: NORMAL
BASE EXCESS / DEFICIT, ARTERIAL - RESPIRATORY: -10 MMOL/L (ref -2–3)
BASE EXCESS / DEFICIT, ARTERIAL - RESPIRATORY: -4 MMOL/L (ref -2–3)
BASE EXCESS / DEFICIT, ARTERIAL - RESPIRATORY: -5 MMOL/L (ref -2–3)
BASOPHILS # BLD: 0 K/MCL (ref 0–0.3)
BASOPHILS NFR BLD: 0 %
BDY SITE: ABNORMAL
BILIRUB SERPL-MCNC: 0.3 MG/DL (ref 0.2–1)
BLD GP AB SCN SERPL QL GEL: NEGATIVE
BLOOD EXPIRATION DATE: NORMAL
BODY TEMPERATURE: 35.7 DEGREES
BODY TEMPERATURE: 36.4 DEGREES
BODY TEMPERATURE: 37 DEGREES
BUN SERPL-MCNC: 105 MG/DL (ref 6–20)
BUN SERPL-MCNC: 105 MG/DL (ref 6–20)
BUN SERPL-MCNC: 98 MG/DL (ref 6–20)
BUN/CREAT SERPL: 15 (ref 7–25)
BUN/CREAT SERPL: 15 (ref 7–25)
BUN/CREAT SERPL: 16 (ref 7–25)
CALCIUM SERPL-MCNC: 8.7 MG/DL (ref 8.4–10.2)
CALCIUM SERPL-MCNC: 8.7 MG/DL (ref 8.4–10.2)
CALCIUM SERPL-MCNC: 8.9 MG/DL (ref 8.4–10.2)
CHLORIDE SERPL-SCNC: 104 MMOL/L (ref 98–107)
CHLORIDE SERPL-SCNC: 105 MMOL/L (ref 98–107)
CHLORIDE SERPL-SCNC: 110 MMOL/L (ref 98–107)
CHOLEST SERPL-MCNC: 91 MG/DL
CHOLEST/HDLC SERPL: 2.8 {RATIO}
CLOSURE TME BLD-IMP: ABNORMAL
CLOSURE TME COLL+ADP BLD: 158 SEC (ref 50–114)
CLOSURE TME COLL+EPINEP BLD: 178 SEC (ref 70–160)
CO2 SERPL-SCNC: 18 MMOL/L (ref 21–32)
CO2 SERPL-SCNC: 19 MMOL/L (ref 21–32)
CO2 SERPL-SCNC: 19 MMOL/L (ref 21–32)
COHGB MFR BLDV: 1.5 %
CONDITION: ABNORMAL
CONDITION: ABNORMAL
CREAT SERPL-MCNC: 5.99 MG/DL (ref 0.67–1.17)
CREAT SERPL-MCNC: 6.79 MG/DL (ref 0.67–1.17)
CREAT SERPL-MCNC: 6.83 MG/DL (ref 0.67–1.17)
CROSSMATCH RESULT: NORMAL
DEPRECATED RDW RBC: 62.4 FL (ref 39–50)
DEPRECATED RDW RBC: 67.8 FL (ref 39–50)
DEPRECATED RDW RBC: 68.2 FL (ref 39–50)
DISPENSE STATUS: NORMAL
E WAVE DECELARATION TIME (MDT): 327
EOSINOPHIL # BLD: 0 K/MCL (ref 0–0.5)
EOSINOPHIL NFR BLD: 0 %
ERYTHROCYTE [DISTWIDTH] IN BLOOD: 17.3 % (ref 11–15)
ERYTHROCYTE [DISTWIDTH] IN BLOOD: 19.8 % (ref 11–15)
ERYTHROCYTE [DISTWIDTH] IN BLOOD: 20.7 % (ref 11–15)
FASTING DURATION TIME PATIENT: ABNORMAL H
FIO2 ON VENT: 30 %
FIO2 ON VENT: 30 %
FLUAV RNA RESP QL NAA+PROBE: NOT DETECTED
FLUBV RNA RESP QL NAA+PROBE: NOT DETECTED
GFR SERPLBLD BASED ON 1.73 SQ M-ARVRAT: 8 ML/MIN
GFR SERPLBLD BASED ON 1.73 SQ M-ARVRAT: 8 ML/MIN
GFR SERPLBLD BASED ON 1.73 SQ M-ARVRAT: 9 ML/MIN
GLOBULIN SER-MCNC: 5.5 G/DL (ref 2–4)
GLUCOSE BLDC GLUCOMTR-MCNC: 101 MG/DL (ref 70–99)
GLUCOSE BLDC GLUCOMTR-MCNC: 110 MG/DL (ref 70–99)
GLUCOSE BLDC GLUCOMTR-MCNC: 192 MG/DL (ref 70–99)
GLUCOSE BLDC GLUCOMTR-MCNC: 215 MG/DL (ref 70–99)
GLUCOSE BLDC GLUCOMTR-MCNC: 265 MG/DL (ref 70–99)
GLUCOSE BLDC GLUCOMTR-MCNC: 330 MG/DL (ref 70–99)
GLUCOSE SERPL-MCNC: 261 MG/DL (ref 70–99)
GLUCOSE SERPL-MCNC: 315 MG/DL (ref 70–99)
GLUCOSE SERPL-MCNC: 356 MG/DL (ref 70–99)
HBA1C MFR BLD: 7.4 % (ref 4.5–5.6)
HCO3 BLDA-SCNC: 16 MMOL/L (ref 22–28)
HCO3 BLDA-SCNC: 19 MMOL/L (ref 22–28)
HCO3 BLDA-SCNC: 21 MMOL/L (ref 22–28)
HCT VFR BLD CALC: 21 % (ref 39–51)
HCT VFR BLD CALC: 22.4 % (ref 39–51)
HCT VFR BLD CALC: 23 % (ref 39–51)
HCT VFR BLD CALC: 24.8 % (ref 39–51)
HDLC SERPL-MCNC: 32 MG/DL
HGB BLD-MCNC: 7 G/DL (ref 13–17)
HGB BLD-MCNC: 7.2 G/DL (ref 13–17)
HGB BLD-MCNC: 7.4 G/DL (ref 13–17)
HGB BLD-MCNC: 8.3 G/DL (ref 13–17)
HOROWITZ INDEX BLDA+IHG-RTO: 433 MM[HG] (ref 300–500)
HOROWITZ INDEX BLDA+IHG-RTO: 550 MM[HG] (ref 300–500)
IMM GRANULOCYTES # BLD AUTO: 0.1 K/MCL (ref 0–0.2)
IMM GRANULOCYTES # BLD: 1 %
INR PPP: 1
INR PPP: 1
ISBT BLOOD TYPE: 6200
ISSUE DATE/TIME: NORMAL
LDLC SERPL CALC-MCNC: 44 MG/DL
LEFT INTERNAL DIMENSION IN SYSTOLE (LVSD): 2.48
LEFT VENTRICULAR INTERNAL DIMENSION IN DIASTOLE (LVDD): 3.54
LV EF: NORMAL %
LVOT 2D (LVOTD): 2
LYMPHOCYTES # BLD: 0.8 K/MCL (ref 1–4)
LYMPHOCYTES # BLD: 0.9 K/MCL (ref 1–4)
LYMPHOCYTES # BLD: 2 K/MCL (ref 1–4)
LYMPHOCYTES NFR BLD: 12 %
LYMPHOCYTES NFR BLD: 5 %
LYMPHOCYTES NFR BLD: 6 %
MAGNESIUM SERPL-MCNC: 2.3 MG/DL (ref 1.7–2.4)
MCH RBC QN AUTO: 31.7 PG (ref 26–34)
MCH RBC QN AUTO: 31.8 PG (ref 26–34)
MCH RBC QN AUTO: 32.8 PG (ref 26–34)
MCHC RBC AUTO-ENTMCNC: 31.3 G/DL (ref 32–36.5)
MCHC RBC AUTO-ENTMCNC: 33 G/DL (ref 32–36.5)
MCHC RBC AUTO-ENTMCNC: 33.5 G/DL (ref 32–36.5)
MCV RBC AUTO: 101.3 FL (ref 78–100)
MCV RBC AUTO: 96.1 FL (ref 78–100)
MCV RBC AUTO: 98 FL (ref 78–100)
METHGB MFR BLDMV: 1.6 %
MONOCYTES # BLD: 0.7 K/MCL (ref 0.3–0.9)
MONOCYTES # BLD: 1 K/MCL (ref 0.3–0.9)
MONOCYTES # BLD: 1.2 K/MCL (ref 0.3–0.9)
MONOCYTES NFR BLD: 5 %
MONOCYTES NFR BLD: 6 %
MONOCYTES NFR BLD: 8 %
MV E TISSUE VEL LAT (MELV): 6.09
MV E TISSUE VEL MED (MESV): 4.35
MV E WAVE VEL/E TISSUE VEL MED(MSR): 25.06
NEUTROPHILS # BLD: 13.1 K/MCL (ref 1.8–7.7)
NEUTROPHILS # BLD: 13.9 K/MCL (ref 1.8–7.7)
NEUTROPHILS # BLD: 14.2 K/MCL (ref 1.8–7.7)
NEUTROPHILS NFR BLD: 81 %
NEUTROPHILS NFR BLD: 86 %
NEUTROPHILS NFR BLD: 88 %
NONHDLC SERPL-MCNC: 59 MG/DL
NRBC BLD MANUAL-RTO: 0 /100 WBC
OXYHGB MFR BLDA: 96.9 % (ref 94–98)
P AXIS (DEGREES): 27
PCO2 BLDA: 30 MM HG (ref 35–48)
PCO2 BLDA: 30 MM HG (ref 35–48)
PCO2 BLDA: 38 MM HG (ref 35–48)
PH BLDA: 7.32 UNITS (ref 7.35–7.45)
PH BLDA: 7.35 UNITS (ref 7.35–7.45)
PH BLDA: 7.4 UNITS (ref 7.35–7.45)
PHOSPHATE SERPL-MCNC: 5.2 MG/DL (ref 2.4–4.7)
PLATELET # BLD AUTO: 208 K/MCL (ref 140–450)
PLATELET # BLD AUTO: 245 K/MCL (ref 140–450)
PLATELET # BLD AUTO: 275 K/MCL (ref 140–450)
PO2 BLDA: 122 MM HG (ref 83–108)
PO2 BLDA: 162 MM HG (ref 83–108)
PO2 BLDA: 241 MM HG (ref 83–108)
POTASSIUM SERPL-SCNC: 3.9 MMOL/L (ref 3.4–5.1)
POTASSIUM SERPL-SCNC: 4 MMOL/L (ref 3.4–5.1)
POTASSIUM SERPL-SCNC: 4.1 MMOL/L (ref 3.4–5.1)
PR-INTERVAL (MSEC): 150
PRODUCT CODE: NORMAL
PRODUCT DESCRIPTION: NORMAL
PRODUCT ID: NORMAL
PROT SERPL-MCNC: 7.9 G/DL (ref 6.4–8.2)
PROTHROMBIN TIME: 10.7 SEC (ref 9.7–11.8)
PROTHROMBIN TIME: 11 SEC (ref 9.7–11.8)
QRS-INTERVAL (MSEC): 140
QT-INTERVAL (MSEC): 464
QTC: 539
R AXIS (DEGREES): -78
RBC # BLD: 2.27 MIL/MCL (ref 4.5–5.9)
RBC # BLD: 2.33 MIL/MCL (ref 4.5–5.9)
RBC # BLD: 2.53 MIL/MCL (ref 4.5–5.9)
REPORT TEXT: NORMAL
RSV AG NPH QL IA.RAPID: NOT DETECTED
SAO2 % BLDA: 10 % (ref 15–23)
SAO2 % BLDA: 100 % (ref 95–99)
SAO2 % BLDA: 99 % (ref 95–99)
SAO2 % BLDA: 99 % (ref 95–99)
SARS-COV-2 RNA RESP QL NAA+PROBE: NOT DETECTED
SERVICE CMNT-IMP: NORMAL
SERVICE CMNT-IMP: NORMAL
SODIUM SERPL-SCNC: 138 MMOL/L (ref 135–145)
SODIUM SERPL-SCNC: 139 MMOL/L (ref 135–145)
SODIUM SERPL-SCNC: 139 MMOL/L (ref 135–145)
SODIUM SERPL-SCNC: 140 MMOL/L (ref 135–145)
SODIUM SERPL-SCNC: 146 MMOL/L (ref 135–145)
T AXIS (DEGREES): 58
TRICUSPID ANNULAR PLANE SYSTOLIC EXCURSION (TAPSE): 2.13
TRIGL SERPL-MCNC: 76 MG/DL
TYPE AND SCREEN EXPIRATION DATE: NORMAL
UNIT BLOOD TYPE: NORMAL
UNIT NUMBER: NORMAL
VENTRICULAR RATE EKG/MIN (BPM): 81
WBC # BLD: 14.9 K/MCL (ref 4.2–11)
WBC # BLD: 16.4 K/MCL (ref 4.2–11)
WBC # BLD: 17 K/MCL (ref 4.2–11)

## 2022-01-01 PROCEDURE — 10002807 HB RX 258: Performed by: SURGERY

## 2022-01-01 PROCEDURE — 80048 BASIC METABOLIC PNL TOTAL CA: CPT | Performed by: PHYSICIAN ASSISTANT

## 2022-01-01 PROCEDURE — 36415 COLL VENOUS BLD VENIPUNCTURE: CPT | Performed by: PHYSICIAN ASSISTANT

## 2022-01-01 PROCEDURE — 82803 BLOOD GASES ANY COMBINATION: CPT

## 2022-01-01 PROCEDURE — 70450 CT HEAD/BRAIN W/O DYE: CPT

## 2022-01-01 PROCEDURE — 80053 COMPREHEN METABOLIC PANEL: CPT

## 2022-01-01 PROCEDURE — 99223 1ST HOSP IP/OBS HIGH 75: CPT | Performed by: PHYSICIAN ASSISTANT

## 2022-01-01 PROCEDURE — 0241U COVID/FLU/RSV PANEL: CPT

## 2022-01-01 PROCEDURE — 85025 COMPLETE CBC W/AUTO DIFF WBC: CPT

## 2022-01-01 PROCEDURE — G1004 CDSM NDSC: HCPCS

## 2022-01-01 PROCEDURE — 10002800 HB RX 250 W HCPCS: Performed by: SURGERY

## 2022-01-01 PROCEDURE — 93306 TTE W/DOPPLER COMPLETE: CPT | Performed by: INTERNAL MEDICINE

## 2022-01-01 PROCEDURE — 10002801 HB RX 250 W/O HCPCS

## 2022-01-01 PROCEDURE — 10002801 HB RX 250 W/O HCPCS: Performed by: NEUROLOGICAL SURGERY

## 2022-01-01 PROCEDURE — 10002800 HB RX 250 W HCPCS: Performed by: PHYSICIAN ASSISTANT

## 2022-01-01 PROCEDURE — 10004651 HB RX, NO CHARGE ITEM

## 2022-01-01 PROCEDURE — 99238 HOSP IP/OBS DSCHRG MGMT 30/<: CPT | Performed by: SURGERY

## 2022-01-01 PROCEDURE — 93306 TTE W/DOPPLER COMPLETE: CPT

## 2022-01-01 PROCEDURE — 10002800 HB RX 250 W HCPCS: Performed by: SPECIALIST

## 2022-01-01 PROCEDURE — 10002807 HB RX 258: Performed by: PHYSICIAN ASSISTANT

## 2022-01-01 PROCEDURE — 5A1945Z RESPIRATORY VENTILATION, 24-96 CONSECUTIVE HOURS: ICD-10-PCS

## 2022-01-01 PROCEDURE — C9803 HOPD COVID-19 SPEC COLLECT: HCPCS

## 2022-01-01 PROCEDURE — 13000112 HB NEURO MAJOR COMPLEX CASE S/U + 1ST 15 MIN: Performed by: NEUROLOGICAL SURGERY

## 2022-01-01 PROCEDURE — 85730 THROMBOPLASTIN TIME PARTIAL: CPT

## 2022-01-01 PROCEDURE — 84100 ASSAY OF PHOSPHORUS: CPT | Performed by: SURGERY

## 2022-01-01 PROCEDURE — 10002805 HB CONTRAST AGENT: Performed by: PHYSICIAN ASSISTANT

## 2022-01-01 PROCEDURE — 13000002 HB ANESTHESIA  GENERAL  S/U + 1ST 15 MIN: Performed by: NEUROLOGICAL SURGERY

## 2022-01-01 PROCEDURE — 10002800 HB RX 250 W HCPCS

## 2022-01-01 PROCEDURE — 36600 WITHDRAWAL OF ARTERIAL BLOOD: CPT

## 2022-01-01 PROCEDURE — 85025 COMPLETE CBC W/AUTO DIFF WBC: CPT | Performed by: PHYSICIAN ASSISTANT

## 2022-01-01 PROCEDURE — 10004180 HB COUNTER-TRANSPORT

## 2022-01-01 PROCEDURE — 10003579 HB TRAUMA W/O CRITICAL CARE

## 2022-01-01 PROCEDURE — 86901 BLOOD TYPING SEROLOGIC RH(D): CPT

## 2022-01-01 PROCEDURE — 99292 CRITICAL CARE ADDL 30 MIN: CPT | Performed by: SURGERY

## 2022-01-01 PROCEDURE — 94003 VENT MGMT INPAT SUBQ DAY: CPT

## 2022-01-01 PROCEDURE — 71045 X-RAY EXAM CHEST 1 VIEW: CPT

## 2022-01-01 PROCEDURE — 10002801 HB RX 250 W/O HCPCS: Performed by: PHYSICIAN ASSISTANT

## 2022-01-01 PROCEDURE — 13000113 HB NEURO MAJOR COMPLEX CASE EA ADD MINUTE: Performed by: NEUROLOGICAL SURGERY

## 2022-01-01 PROCEDURE — 10006023 HB SUPPLY 272: Performed by: NEUROLOGICAL SURGERY

## 2022-01-01 PROCEDURE — 36415 COLL VENOUS BLD VENIPUNCTURE: CPT | Performed by: NURSE PRACTITIONER

## 2022-01-01 PROCEDURE — 61312 CRNEC/CRNOT STTL XDRL/SDRL: CPT | Performed by: NEUROLOGICAL SURGERY

## 2022-01-01 PROCEDURE — 80061 LIPID PANEL: CPT | Performed by: PSYCHIATRY & NEUROLOGY

## 2022-01-01 PROCEDURE — 95819 EEG AWAKE AND ASLEEP: CPT

## 2022-01-01 PROCEDURE — 90945 DIALYSIS ONE EVALUATION: CPT

## 2022-01-01 PROCEDURE — 10002803 HB RX 637: Performed by: SURGERY

## 2022-01-01 PROCEDURE — 94002 VENT MGMT INPAT INIT DAY: CPT

## 2022-01-01 PROCEDURE — 99291 CRITICAL CARE FIRST HOUR: CPT | Performed by: SURGERY

## 2022-01-01 PROCEDURE — 70553 MRI BRAIN STEM W/O & W/DYE: CPT

## 2022-01-01 PROCEDURE — 93005 ELECTROCARDIOGRAM TRACING: CPT

## 2022-01-01 PROCEDURE — 10006027 HB SUPPLY 278: Performed by: NEUROLOGICAL SURGERY

## 2022-01-01 PROCEDURE — 83735 ASSAY OF MAGNESIUM: CPT | Performed by: SURGERY

## 2022-01-01 PROCEDURE — 10000008 HB ROOM CHARGE ICU OR CCU

## 2022-01-01 PROCEDURE — 85610 PROTHROMBIN TIME: CPT | Performed by: PHYSICIAN ASSISTANT

## 2022-01-01 PROCEDURE — 10002803 HB RX 637

## 2022-01-01 PROCEDURE — 99024 POSTOP FOLLOW-UP VISIT: CPT | Performed by: NURSE PRACTITIONER

## 2022-01-01 PROCEDURE — 10004651 HB RX, NO CHARGE ITEM: Performed by: PHYSICIAN ASSISTANT

## 2022-01-01 PROCEDURE — 83036 HEMOGLOBIN GLYCOSYLATED A1C: CPT | Performed by: SURGERY

## 2022-01-01 PROCEDURE — 85018 HEMOGLOBIN: CPT

## 2022-01-01 PROCEDURE — 99291 CRITICAL CARE FIRST HOUR: CPT

## 2022-01-01 PROCEDURE — P9016 RBC LEUKOCYTES REDUCED: HCPCS

## 2022-01-01 PROCEDURE — 86923 COMPATIBILITY TEST ELECTRIC: CPT

## 2022-01-01 PROCEDURE — 85610 PROTHROMBIN TIME: CPT

## 2022-01-01 PROCEDURE — 99232 SBSQ HOSP IP/OBS MODERATE 35: CPT | Performed by: PHYSICIAN ASSISTANT

## 2022-01-01 PROCEDURE — 00C40ZZ EXTIRPATION OF MATTER FROM INTRACRANIAL SUBDURAL SPACE, OPEN APPROACH: ICD-10-PCS | Performed by: NEUROLOGICAL SURGERY

## 2022-01-01 PROCEDURE — 61312 CRNEC/CRNOT STTL XDRL/SDRL: CPT | Performed by: PHYSICIAN ASSISTANT

## 2022-01-01 PROCEDURE — C1713 ANCHOR/SCREW BN/BN,TIS/BN: HCPCS | Performed by: NEUROLOGICAL SURGERY

## 2022-01-01 PROCEDURE — A9585 GADOBUTROL INJECTION: HCPCS | Performed by: PHYSICIAN ASSISTANT

## 2022-01-01 PROCEDURE — 10002019 HB COUNTER RESP ASSESSMENT

## 2022-01-01 PROCEDURE — 10002807 HB RX 258: Performed by: SPECIALIST

## 2022-01-01 PROCEDURE — 85576 BLOOD PLATELET AGGREGATION: CPT | Performed by: PHYSICIAN ASSISTANT

## 2022-01-01 PROCEDURE — 36415 COLL VENOUS BLD VENIPUNCTURE: CPT

## 2022-01-01 PROCEDURE — 84295 ASSAY OF SERUM SODIUM: CPT | Performed by: NURSE PRACTITIONER

## 2022-01-01 PROCEDURE — 85730 THROMBOPLASTIN TIME PARTIAL: CPT | Performed by: PHYSICIAN ASSISTANT

## 2022-01-01 PROCEDURE — 80048 BASIC METABOLIC PNL TOTAL CA: CPT

## 2022-01-01 PROCEDURE — 13000003 HB ANESTHESIA  GENERAL EA ADD MINUTE: Performed by: NEUROLOGICAL SURGERY

## 2022-01-01 PROCEDURE — 10002803 HB RX 637: Performed by: PHYSICIAN ASSISTANT

## 2022-01-01 PROCEDURE — 76376 3D RENDER W/INTRP POSTPROCES: CPT | Performed by: INTERNAL MEDICINE

## 2022-01-01 PROCEDURE — 10002800 HB RX 250 W HCPCS: Performed by: NEUROLOGICAL SURGERY

## 2022-01-01 PROCEDURE — 99285 EMERGENCY DEPT VISIT HI MDM: CPT

## 2022-01-01 PROCEDURE — 10002801 HB RX 250 W/O HCPCS: Performed by: SPECIALIST

## 2022-01-01 DEVICE — PLATE BN 14X14X.4MM CRNL LOWPRFL BOX TI MATRIXNEURO BLUE: Type: IMPLANTABLE DEVICE | Site: HEAD | Status: FUNCTIONAL

## 2022-01-01 DEVICE — DURAGEN® PLUS DURAL REGENERATION MATRIX, 3 IN X 3 IN (7.5 CM X 7.5 CM)
Type: IMPLANTABLE DEVICE | Site: HEAD | Status: FUNCTIONAL
Brand: DURAGEN® PLUS

## 2022-01-01 DEVICE — FLOSEAL HEMOSTATIC MATRIX, 10ML
Type: IMPLANTABLE DEVICE | Site: HEAD | Status: FUNCTIONAL
Brand: FLOSEAL HEMOSTATIC MATRIX

## 2022-01-01 DEVICE — IMPLANTABLE DEVICE: Type: IMPLANTABLE DEVICE | Site: HEAD | Status: FUNCTIONAL

## 2022-01-01 DEVICE — SCREW BN 1.5MM 2.5MM 4MM MATRIXNEURO SELF DRILL TI NS: Type: IMPLANTABLE DEVICE | Site: HEAD | Status: FUNCTIONAL

## 2022-01-01 DEVICE — COVER BURR HOLE BLUE .4MM 17MM MATRIXNEURO: Type: IMPLANTABLE DEVICE | Site: HEAD | Status: FUNCTIONAL

## 2022-01-01 RX ORDER — SODIUM CHLORIDE 9 MG/ML
INJECTION, SOLUTION INTRAVENOUS CONTINUOUS
Status: DISCONTINUED | OUTPATIENT
Start: 2022-01-01 | End: 2022-01-01

## 2022-01-01 RX ORDER — SODIUM CHLORIDE 9 MG/ML
INJECTION, SOLUTION INTRAVENOUS CONTINUOUS PRN
Status: DISCONTINUED | OUTPATIENT
Start: 2022-01-01 | End: 2022-01-01 | Stop reason: SDUPTHER

## 2022-01-01 RX ORDER — SODIUM CHLORIDE 9 MG/ML
INJECTION, SOLUTION INTRAVENOUS CONTINUOUS PRN
Status: DISCONTINUED | OUTPATIENT
Start: 2022-01-01 | End: 2022-01-01

## 2022-01-01 RX ORDER — HYDRALAZINE HYDROCHLORIDE 20 MG/ML
15 INJECTION INTRAMUSCULAR; INTRAVENOUS EVERY 6 HOURS PRN
Status: DISCONTINUED | OUTPATIENT
Start: 2022-01-01 | End: 2022-01-01

## 2022-01-01 RX ORDER — CHLORHEXIDINE GLUCONATE ORAL RINSE 1.2 MG/ML
15 SOLUTION DENTAL PRN
Status: DISCONTINUED | OUTPATIENT
Start: 2022-01-01 | End: 2022-05-19 | Stop reason: HOSPADM

## 2022-01-01 RX ORDER — LORAZEPAM 2 MG/ML
1 INJECTION INTRAMUSCULAR EVERY 5 MIN PRN
Status: DISCONTINUED | OUTPATIENT
Start: 2022-01-01 | End: 2022-05-19 | Stop reason: HOSPADM

## 2022-01-01 RX ORDER — LACTULOSE 10 G/15ML
20 SOLUTION ORAL DAILY PRN
Status: DISCONTINUED | OUTPATIENT
Start: 2022-01-01 | End: 2022-05-19 | Stop reason: HOSPADM

## 2022-01-01 RX ORDER — 0.9 % SODIUM CHLORIDE 0.9 %
2 VIAL (ML) INJECTION EVERY 12 HOURS SCHEDULED
Status: DISCONTINUED | OUTPATIENT
Start: 2022-01-01 | End: 2022-01-01

## 2022-01-01 RX ORDER — ALBUTEROL SULFATE 90 UG/1
2 AEROSOL, METERED RESPIRATORY (INHALATION)
Status: DISCONTINUED | OUTPATIENT
Start: 2022-01-01 | End: 2022-01-01

## 2022-01-01 RX ORDER — ACETAMINOPHEN 325 MG/1
650 TABLET ORAL EVERY 4 HOURS PRN
Status: DISCONTINUED | OUTPATIENT
Start: 2022-01-01 | End: 2022-01-01

## 2022-01-01 RX ORDER — CARBOXYMETHYLCELLULOSE SODIUM 5 MG/ML
1 SOLUTION/ DROPS OPHTHALMIC PRN
Status: DISCONTINUED | OUTPATIENT
Start: 2022-01-01 | End: 2022-05-19 | Stop reason: HOSPADM

## 2022-01-01 RX ORDER — MAGNESIUM HYDROXIDE/ALUMINUM HYDROXICE/SIMETHICONE 120; 1200; 1200 MG/30ML; MG/30ML; MG/30ML
30 SUSPENSION ORAL PRN
Status: DISCONTINUED | OUTPATIENT
Start: 2022-01-01 | End: 2022-05-19 | Stop reason: HOSPADM

## 2022-01-01 RX ORDER — SODIUM CHLORIDE 9 MG/ML
INJECTION, SOLUTION INTRAVENOUS CONTINUOUS
Status: DISCONTINUED | OUTPATIENT
Start: 2022-01-01 | End: 2022-05-19 | Stop reason: HOSPADM

## 2022-01-01 RX ORDER — SODIUM CHLORIDE 3 G/100ML
250 INJECTION, SOLUTION INTRAVENOUS EVERY 6 HOURS PRN
Status: DISCONTINUED | OUTPATIENT
Start: 2022-01-01 | End: 2022-01-01

## 2022-01-01 RX ORDER — DOCUSATE SODIUM 100 MG/1
200 CAPSULE, LIQUID FILLED ORAL 2 TIMES DAILY
Status: DISCONTINUED | OUTPATIENT
Start: 2022-01-01 | End: 2022-01-01

## 2022-01-01 RX ORDER — SCOLOPAMINE TRANSDERMAL SYSTEM 1 MG/1
1 PATCH, EXTENDED RELEASE TRANSDERMAL
Status: DISCONTINUED | OUTPATIENT
Start: 2022-01-01 | End: 2022-05-19 | Stop reason: HOSPADM

## 2022-01-01 RX ORDER — ALBUTEROL SULFATE 2.5 MG/3ML
2.5 SOLUTION RESPIRATORY (INHALATION)
Status: DISCONTINUED | OUTPATIENT
Start: 2022-01-01 | End: 2022-01-01

## 2022-01-01 RX ORDER — NICOTINE POLACRILEX 4 MG
15 LOZENGE BUCCAL PRN
Status: DISCONTINUED | OUTPATIENT
Start: 2022-01-01 | End: 2022-01-01

## 2022-01-01 RX ORDER — LEVETIRACETAM 500 MG/5ML
500 INJECTION, SOLUTION, CONCENTRATE INTRAVENOUS EVERY 12 HOURS SCHEDULED
Status: DISCONTINUED | OUTPATIENT
Start: 2022-01-01 | End: 2022-01-01

## 2022-01-01 RX ORDER — ONDANSETRON 4 MG/1
4 TABLET, ORALLY DISINTEGRATING ORAL EVERY 12 HOURS PRN
Status: DISCONTINUED | OUTPATIENT
Start: 2022-01-01 | End: 2022-05-19 | Stop reason: HOSPADM

## 2022-01-01 RX ORDER — AMOXICILLIN 250 MG
2 CAPSULE ORAL 2 TIMES DAILY PRN
Status: DISCONTINUED | OUTPATIENT
Start: 2022-01-01 | End: 2022-05-19 | Stop reason: HOSPADM

## 2022-01-01 RX ORDER — FAMOTIDINE 10 MG/ML
20 INJECTION, SOLUTION INTRAVENOUS DAILY
Status: DISCONTINUED | OUTPATIENT
Start: 2022-01-01 | End: 2022-01-01 | Stop reason: SDUPTHER

## 2022-01-01 RX ORDER — NICOTINE POLACRILEX 4 MG
30 LOZENGE BUCCAL PRN
Status: DISCONTINUED | OUTPATIENT
Start: 2022-01-01 | End: 2022-01-01

## 2022-01-01 RX ORDER — INSULIN LISPRO 100 [IU]/ML
4 INJECTION, SOLUTION INTRAVENOUS; SUBCUTANEOUS
Status: DISCONTINUED | OUTPATIENT
Start: 2022-01-01 | End: 2022-01-01

## 2022-01-01 RX ORDER — DIPHENHYDRAMINE HYDROCHLORIDE 50 MG/ML
12.5 INJECTION INTRAMUSCULAR; INTRAVENOUS EVERY 6 HOURS PRN
Status: DISCONTINUED | OUTPATIENT
Start: 2022-01-01 | End: 2022-05-19 | Stop reason: HOSPADM

## 2022-01-01 RX ORDER — BISACODYL 10 MG
10 SUPPOSITORY, RECTAL RECTAL DAILY PRN
Status: DISCONTINUED | OUTPATIENT
Start: 2022-01-01 | End: 2022-05-19 | Stop reason: HOSPADM

## 2022-01-01 RX ORDER — GLYCOPYRROLATE 0.2 MG/ML
0.2 INJECTION, SOLUTION INTRAMUSCULAR; INTRAVENOUS EVERY 4 HOURS PRN
Status: DISCONTINUED | OUTPATIENT
Start: 2022-01-01 | End: 2022-05-19 | Stop reason: HOSPADM

## 2022-01-01 RX ORDER — ONDANSETRON 2 MG/ML
4 INJECTION INTRAMUSCULAR; INTRAVENOUS EVERY 12 HOURS PRN
Status: DISCONTINUED | OUTPATIENT
Start: 2022-01-01 | End: 2022-05-19 | Stop reason: HOSPADM

## 2022-01-01 RX ORDER — CHLORHEXIDINE GLUCONATE ORAL RINSE 1.2 MG/ML
15 SOLUTION DENTAL EVERY 12 HOURS SCHEDULED
Status: DISCONTINUED | OUTPATIENT
Start: 2022-01-01 | End: 2022-05-19 | Stop reason: HOSPADM

## 2022-01-01 RX ORDER — LIDOCAINE HYDROCHLORIDE 20 MG/ML
10 JELLY TOPICAL PRN
Status: DISCONTINUED | OUTPATIENT
Start: 2022-01-01 | End: 2022-05-19 | Stop reason: HOSPADM

## 2022-01-01 RX ORDER — 0.9 % SODIUM CHLORIDE 0.9 %
2 VIAL (ML) INJECTION EVERY 12 HOURS SCHEDULED
Status: DISCONTINUED | OUTPATIENT
Start: 2022-01-01 | End: 2022-05-19 | Stop reason: HOSPADM

## 2022-01-01 RX ORDER — FAMOTIDINE 10 MG/ML
20 INJECTION, SOLUTION INTRAVENOUS DAILY
Status: DISCONTINUED | OUTPATIENT
Start: 2022-01-01 | End: 2022-01-01

## 2022-01-01 RX ORDER — LORAZEPAM 0.5 MG/1
0.5 TABLET ORAL
Status: DISCONTINUED | OUTPATIENT
Start: 2022-01-01 | End: 2022-05-19 | Stop reason: HOSPADM

## 2022-01-01 RX ORDER — ATORVASTATIN CALCIUM 40 MG/1
40 TABLET, FILM COATED ORAL NIGHTLY
Status: DISCONTINUED | OUTPATIENT
Start: 2022-01-01 | End: 2022-01-01

## 2022-01-01 RX ORDER — GADOBUTROL 604.72 MG/ML
7.5 INJECTION INTRAVENOUS ONCE
Status: COMPLETED | OUTPATIENT
Start: 2022-01-01 | End: 2022-01-01

## 2022-01-01 RX ORDER — DEXTROSE AND SODIUM CHLORIDE 5; .45 G/100ML; G/100ML
INJECTION, SOLUTION INTRAVENOUS CONTINUOUS
Status: DISCONTINUED | OUTPATIENT
Start: 2022-01-01 | End: 2022-01-01

## 2022-01-01 RX ORDER — LIDOCAINE HYDROCHLORIDE AND EPINEPHRINE 10; 10 MG/ML; UG/ML
INJECTION, SOLUTION INFILTRATION; PERINEURAL PRN
Status: DISCONTINUED | OUTPATIENT
Start: 2022-01-01 | End: 2022-01-01 | Stop reason: HOSPADM

## 2022-01-01 RX ORDER — HYDROCODONE BITARTRATE AND ACETAMINOPHEN 5; 325 MG/1; MG/1
1 TABLET ORAL EVERY 4 HOURS PRN
Status: DISCONTINUED | OUTPATIENT
Start: 2022-01-01 | End: 2022-01-01

## 2022-01-01 RX ORDER — ONDANSETRON 4 MG/1
4 TABLET, ORALLY DISINTEGRATING ORAL EVERY 12 HOURS PRN
Status: DISCONTINUED | OUTPATIENT
Start: 2022-01-01 | End: 2022-01-01

## 2022-01-01 RX ORDER — LEVETIRACETAM 500 MG/5ML
500 INJECTION, SOLUTION, CONCENTRATE INTRAVENOUS DAILY
Status: DISCONTINUED | OUTPATIENT
Start: 2022-01-01 | End: 2022-01-01

## 2022-01-01 RX ORDER — LORAZEPAM 2 MG/ML
0.5 INJECTION INTRAMUSCULAR
Status: DISCONTINUED | OUTPATIENT
Start: 2022-01-01 | End: 2022-05-19 | Stop reason: HOSPADM

## 2022-01-01 RX ORDER — ROCURONIUM BROMIDE 10 MG/ML
INJECTION, SOLUTION INTRAVENOUS PRN
Status: DISCONTINUED | OUTPATIENT
Start: 2022-01-01 | End: 2022-01-01

## 2022-01-01 RX ORDER — DEXTROSE MONOHYDRATE 25 G/50ML
25 INJECTION, SOLUTION INTRAVENOUS PRN
Status: DISCONTINUED | OUTPATIENT
Start: 2022-01-01 | End: 2022-01-01

## 2022-01-01 RX ORDER — ONDANSETRON 2 MG/ML
4 INJECTION INTRAMUSCULAR; INTRAVENOUS EVERY 12 HOURS PRN
Status: DISCONTINUED | OUTPATIENT
Start: 2022-01-01 | End: 2022-01-01

## 2022-01-01 RX ORDER — DEXTROSE MONOHYDRATE 25 G/50ML
12.5 INJECTION, SOLUTION INTRAVENOUS PRN
Status: DISCONTINUED | OUTPATIENT
Start: 2022-01-01 | End: 2022-01-01

## 2022-01-01 RX ORDER — MORPHINE SULFATE IN 0.9 % NACL 30 MG/30ML
0-10 PATIENT CONTROLLED ANALGESIA SYRINGE INTRAVENOUS CONTINUOUS
Status: DISCONTINUED | OUTPATIENT
Start: 2022-01-01 | End: 2022-05-19 | Stop reason: HOSPADM

## 2022-01-01 RX ORDER — HALOPERIDOL 5 MG/ML
1 INJECTION INTRAMUSCULAR
Status: DISCONTINUED | OUTPATIENT
Start: 2022-01-01 | End: 2022-05-19 | Stop reason: HOSPADM

## 2022-01-01 RX ADMIN — INSULIN LISPRO 12 UNITS: 100 INJECTION, SOLUTION INTRAVENOUS; SUBCUTANEOUS at 06:41

## 2022-01-01 RX ADMIN — ROCURONIUM BROMIDE 30 MG: 10 INJECTION INTRAVENOUS at 22:18

## 2022-01-01 RX ADMIN — CHLORHEXIDINE GLUCONATE 15 ML: 1.2 RINSE ORAL at 20:31

## 2022-01-01 RX ADMIN — CHLORHEXIDINE GLUCONATE 15 ML: 1.2 RINSE ORAL at 08:16

## 2022-01-01 RX ADMIN — LEVETIRACETAM 500 MG: 100 INJECTION, SOLUTION INTRAVENOUS at 08:16

## 2022-01-01 RX ADMIN — NICARDIPINE HYDROCHLORIDE 5 MG/HR: 0.2 INJECTION, SOLUTION INTRAVENOUS at 06:02

## 2022-01-01 RX ADMIN — Medication 1 MG/HR: at 18:32

## 2022-01-01 RX ADMIN — SODIUM CHLORIDE: 9 INJECTION, SOLUTION INTRAVENOUS at 10:10

## 2022-01-01 RX ADMIN — ROCURONIUM BROMIDE 30 MG: 10 INJECTION INTRAVENOUS at 20:33

## 2022-01-01 RX ADMIN — NICARDIPINE HYDROCHLORIDE 3 MG/HR: 0.2 INJECTION, SOLUTION INTRAVENOUS at 05:36

## 2022-01-01 RX ADMIN — HYDRALAZINE HYDROCHLORIDE 15 MG: 20 INJECTION INTRAMUSCULAR; INTRAVENOUS at 03:50

## 2022-01-01 RX ADMIN — SODIUM CHLORIDE: 9 INJECTION, SOLUTION INTRAVENOUS at 06:31

## 2022-01-01 RX ADMIN — FAMOTIDINE 20 MG: 10 INJECTION INTRAVENOUS at 08:00

## 2022-01-01 RX ADMIN — ATORVASTATIN CALCIUM 40 MG: 40 TABLET, FILM COATED ORAL at 20:31

## 2022-01-01 RX ADMIN — INSULIN LISPRO 9 UNITS: 100 INJECTION, SOLUTION INTRAVENOUS; SUBCUTANEOUS at 00:06

## 2022-01-01 RX ADMIN — DOCUSATE SODIUM 100 MG: 50 LIQUID ORAL at 08:01

## 2022-01-01 RX ADMIN — SODIUM CHLORIDE 250 ML: 3 INJECTION, SOLUTION INTRAVENOUS at 07:50

## 2022-01-01 RX ADMIN — NICARDIPINE HYDROCHLORIDE 5 MG/HR: 0.2 INJECTION INTRAVENOUS at 06:02

## 2022-01-01 RX ADMIN — INSULIN LISPRO 6 UNITS: 100 INJECTION, SOLUTION INTRAVENOUS; SUBCUTANEOUS at 11:34

## 2022-01-01 RX ADMIN — SODIUM CHLORIDE: 9 INJECTION, SOLUTION INTRAVENOUS at 20:20

## 2022-01-01 RX ADMIN — CEFAZOLIN SODIUM 2000 MG: 300 INJECTION, POWDER, LYOPHILIZED, FOR SOLUTION INTRAVENOUS at 05:55

## 2022-01-01 RX ADMIN — INSULIN LISPRO 12 UNITS: 100 INJECTION, SOLUTION INTRAVENOUS; SUBCUTANEOUS at 00:14

## 2022-01-01 RX ADMIN — CEFAZOLIN SODIUM 2000 MG: 300 INJECTION, POWDER, LYOPHILIZED, FOR SOLUTION INTRAVENOUS at 22:15

## 2022-01-01 RX ADMIN — ROCURONIUM BROMIDE 20 MG: 10 INJECTION INTRAVENOUS at 20:50

## 2022-01-01 RX ADMIN — LEVETIRACETAM 500 MG: 100 INJECTION, SOLUTION INTRAVENOUS at 20:27

## 2022-01-01 RX ADMIN — SODIUM CHLORIDE, PRESERVATIVE FREE 2 ML: 5 INJECTION INTRAVENOUS at 08:26

## 2022-01-01 RX ADMIN — FAMOTIDINE 20 MG: 10 INJECTION INTRAVENOUS at 08:17

## 2022-01-01 RX ADMIN — INSULIN LISPRO 3 UNITS: 100 INJECTION, SOLUTION INTRAVENOUS; SUBCUTANEOUS at 05:44

## 2022-01-01 RX ADMIN — SODIUM CHLORIDE, PRESERVATIVE FREE 2 ML: 5 INJECTION INTRAVENOUS at 20:28

## 2022-01-01 RX ADMIN — CHLORHEXIDINE GLUCONATE 15 ML: 1.2 RINSE ORAL at 08:01

## 2022-01-01 RX ADMIN — DOCUSATE SODIUM 100 MG: 50 LIQUID ORAL at 08:16

## 2022-01-01 RX ADMIN — SODIUM CHLORIDE 250 ML: 3 INJECTION, SOLUTION INTRAVENOUS at 01:37

## 2022-01-01 RX ADMIN — CEFAZOLIN SODIUM 2000 MG: 300 INJECTION, POWDER, LYOPHILIZED, FOR SOLUTION INTRAVENOUS at 20:01

## 2022-01-01 RX ADMIN — DOCUSATE SODIUM 100 MG: 50 LIQUID ORAL at 20:31

## 2022-01-01 RX ADMIN — SODIUM CHLORIDE: 0.9 INJECTION, SOLUTION INTRAVENOUS at 19:58

## 2022-01-01 RX ADMIN — ROCURONIUM BROMIDE 20 MG: 10 INJECTION INTRAVENOUS at 21:45

## 2022-01-01 RX ADMIN — SODIUM CHLORIDE: 9 INJECTION, SOLUTION INTRAVENOUS at 00:02

## 2022-01-01 RX ADMIN — GADOBUTROL 7.5 ML: 604.72 INJECTION INTRAVENOUS at 12:18

## 2022-01-01 SDOH — SOCIAL STABILITY: SOCIAL INSECURITY: RISK FACTORS: KIDNEY DISEASE

## 2022-01-01 SDOH — SOCIAL STABILITY: SOCIAL INSECURITY: RISK FACTORS: AGE

## 2022-01-01 SDOH — SOCIAL STABILITY: SOCIAL INSECURITY: RISK FACTORS: NEUROLOGICAL DISEASE

## 2022-01-01 ASSESSMENT — ACTIVITIES OF DAILY LIVING (ADL)
CONTINENCE: INDEPENDENT
TRANSFERRING: NEEDS ASSISTANCE
DRESSING YOURSELF: NEEDS ASSISTANCE
RECENT_DECLINE_ADL: NO
MOBILITY_ASSIST_DEVICES: FRONT-WHEELED WALKER
ADL_SCORE: 8
TOILETING: INDEPENDENT
ADL_BEFORE_ADMISSION: NEEDS/REQUIRES ASSISTANCE
BATHING: NEEDS ASSISTANCE
CHRONIC_PAIN_PRESENT: NO
FEEDING YOURSELF: NEEDS ASSISTANCE
ADL_SHORT_OF_BREATH: NO

## 2022-01-01 ASSESSMENT — PAIN SCALES - BEHAVIORAL PAIN SCALE (BPS)
BPS_SCORE: 3

## 2022-01-01 ASSESSMENT — LIFESTYLE VARIABLES
AUDIT-C TOTAL SCORE: 0
HOW MANY STANDARD DRINKS CONTAINING ALCOHOL DO YOU HAVE ON A TYPICAL DAY: 0,1 OR 2
ALCOHOL_USE_STATUS: NO OR LOW RISK WITH VALIDATED TOOL
HOW OFTEN DO YOU HAVE 6 OR MORE DRINKS ON ONE OCCASION: NEVER
HOW OFTEN DO YOU HAVE A DRINK CONTAINING ALCOHOL: NEVER

## 2022-01-01 ASSESSMENT — COGNITIVE AND FUNCTIONAL STATUS - GENERAL
BECAUSE OF A PHYSICAL, MENTAL, OR EMOTIONAL CONDITION, DO YOU HAVE SERIOUS DIFFICULTY CONCENTRATING, REMEMBERING OR MAKING DECISIONS: YES
ARE YOU DEAF OR DO YOU HAVE SERIOUS DIFFICULTY  HEARING: YES
BECAUSE OF A PHYSICAL, MENTAL, OR EMOTIONAL CONDITION, DO YOU HAVE DIFFICULTY DOING ERRANDS ALONE: YES
DO YOU HAVE SERIOUS DIFFICULTY WALKING OR CLIMBING STAIRS: YES
ARE YOU BLIND OR DO YOU HAVE SERIOUS DIFFICULTY SEEING, EVEN WHEN WEARING GLASSES: YES
DO YOU HAVE DIFFICULTY DRESSING OR BATHING: YES

## 2022-01-01 ASSESSMENT — PATIENT HEALTH QUESTIONNAIRE - PHQ9: IS PATIENT ABLE TO COMPLETE PHQ2 OR PHQ9: NO, DEFER TO LATER TIME

## 2022-01-01 ASSESSMENT — COLUMBIA-SUICIDE SEVERITY RATING SCALE - C-SSRS: IS THE PATIENT ABLE TO COMPLETE C-SSRS: NO, DEFER TO LATER TIME

## 2022-01-21 ENCOUNTER — ANESTHESIA EVENT (OUTPATIENT)
Dept: SURGERY | Facility: HOSPITAL | Age: 77
End: 2022-01-21
Payer: MEDICARE

## 2022-01-22 NOTE — PAT NURSING NOTE
Chart reviewed by anesthesiologist Dr. Charlene Rodriguez due to history of aortic stenosis. Order received to obtain a note of cardiac clearance. Faxed this request to cardiologist as well as the surgeon, with fax confirmation received from both. Telephoned Dr. Ruth Ann Johnson office and spoke with Cristian shermaning the faxed request.  She states she received it and will give the information to Dr. Madison Bull.

## 2022-01-24 NOTE — PAT NURSING NOTE
Telephoned Dr. Molina [place] office and spoke with Rod Zepeda regarding the note of cardiac clearance that was requested on 1/21/22. She states that she sent to information to their [place] office but does not see a clearance note yet. Rod Zepeda will notify the [place] office to check on the status of the clearance note.

## 2022-01-25 ENCOUNTER — HOSPITAL ENCOUNTER (OUTPATIENT)
Facility: HOSPITAL | Age: 77
Setting detail: HOSPITAL OUTPATIENT SURGERY
Discharge: HOME | End: 2022-01-25
Attending: SURGERY | Admitting: SURGERY
Payer: MEDICARE

## 2022-01-25 ENCOUNTER — ANESTHESIA (OUTPATIENT)
Dept: SURGERY | Facility: HOSPITAL | Age: 77
End: 2022-01-25
Payer: MEDICARE

## 2022-01-25 VITALS
BODY MASS INDEX: 17.9 KG/M2 | TEMPERATURE: 99 F | DIASTOLIC BLOOD PRESSURE: 47 MMHG | HEART RATE: 68 BPM | OXYGEN SATURATION: 93 % | SYSTOLIC BLOOD PRESSURE: 136 MMHG | WEIGHT: 125 LBS | HEIGHT: 70 IN | RESPIRATION RATE: 18 BRPM

## 2022-01-25 DIAGNOSIS — N19 RENAL FAILURE, UNSPECIFIED CHRONICITY: ICD-10-CM

## 2022-01-25 DIAGNOSIS — Z01.812 ENCOUNTER FOR PREPROCEDURE SCREENING LABORATORY TESTING FOR COVID-19: ICD-10-CM

## 2022-01-25 DIAGNOSIS — N18.4 CKD (CHRONIC KIDNEY DISEASE) STAGE 4, GFR 15-29 ML/MIN (HCC): Primary | ICD-10-CM

## 2022-01-25 DIAGNOSIS — Z20.822 ENCOUNTER FOR PREPROCEDURE SCREENING LABORATORY TESTING FOR COVID-19: ICD-10-CM

## 2022-01-25 DIAGNOSIS — Z01.818 PRE-OP TESTING: ICD-10-CM

## 2022-01-25 LAB
ANION GAP SERPL CALC-SCNC: 7 MMOL/L (ref 0–18)
ATRIAL RATE: 67 BPM
BUN BLD-MCNC: 18 MG/DL (ref 7–18)
CALCIUM BLD-MCNC: 9 MG/DL (ref 8.5–10.1)
CHLORIDE SERPL-SCNC: 98 MMOL/L (ref 98–112)
CO2 SERPL-SCNC: 30 MMOL/L (ref 21–32)
CREAT BLD-MCNC: 2.71 MG/DL
GLUCOSE BLD-MCNC: 121 MG/DL (ref 70–99)
GLUCOSE BLD-MCNC: 128 MG/DL (ref 70–99)
GLUCOSE BLD-MCNC: 140 MG/DL (ref 70–99)
OSMOLALITY SERPL CALC.SUM OF ELEC: 284 MOSM/KG (ref 275–295)
P AXIS: 4 DEGREES
P-R INTERVAL: 152 MS
POTASSIUM SERPL-SCNC: 3.4 MMOL/L (ref 3.5–5.1)
Q-T INTERVAL: 444 MS
QRS DURATION: 140 MS
QTC CALCULATION (BEZET): 469 MS
R AXIS: -65 DEGREES
SARS-COV-2 RNA RESP QL NAA+PROBE: NOT DETECTED
SODIUM SERPL-SCNC: 135 MMOL/L (ref 136–145)
VENTRICULAR RATE: 67 BPM

## 2022-01-25 PROCEDURE — 82962 GLUCOSE BLOOD TEST: CPT

## 2022-01-25 PROCEDURE — 93005 ELECTROCARDIOGRAM TRACING: CPT

## 2022-01-25 PROCEDURE — 0WHG03Z INSERTION OF INFUSION DEVICE INTO PERITONEAL CAVITY, OPEN APPROACH: ICD-10-PCS | Performed by: SURGERY

## 2022-01-25 PROCEDURE — 93010 ELECTROCARDIOGRAM REPORT: CPT | Performed by: INTERNAL MEDICINE

## 2022-01-25 PROCEDURE — 80048 BASIC METABOLIC PNL TOTAL CA: CPT

## 2022-01-25 RX ORDER — ACETAMINOPHEN 500 MG
1000 TABLET ORAL ONCE
Status: COMPLETED | OUTPATIENT
Start: 2022-01-25 | End: 2022-01-25

## 2022-01-25 RX ORDER — SODIUM CHLORIDE 9 MG/ML
INJECTION, SOLUTION INTRAVENOUS CONTINUOUS
Status: DISCONTINUED | OUTPATIENT
Start: 2022-01-25 | End: 2022-01-25

## 2022-01-25 RX ORDER — NALOXONE HYDROCHLORIDE 0.4 MG/ML
80 INJECTION, SOLUTION INTRAMUSCULAR; INTRAVENOUS; SUBCUTANEOUS AS NEEDED
Status: DISCONTINUED | OUTPATIENT
Start: 2022-01-25 | End: 2022-01-25

## 2022-01-25 RX ORDER — DEXAMETHASONE SODIUM PHOSPHATE 4 MG/ML
VIAL (ML) INJECTION AS NEEDED
Status: DISCONTINUED | OUTPATIENT
Start: 2022-01-25 | End: 2022-01-25 | Stop reason: SURG

## 2022-01-25 RX ORDER — DEXTROSE MONOHYDRATE 25 G/50ML
50 INJECTION, SOLUTION INTRAVENOUS
Status: DISCONTINUED | OUTPATIENT
Start: 2022-01-25 | End: 2022-01-25 | Stop reason: HOSPADM

## 2022-01-25 RX ORDER — ONDANSETRON 2 MG/ML
INJECTION INTRAMUSCULAR; INTRAVENOUS AS NEEDED
Status: DISCONTINUED | OUTPATIENT
Start: 2022-01-25 | End: 2022-01-25 | Stop reason: SURG

## 2022-01-25 RX ORDER — LIDOCAINE HYDROCHLORIDE 10 MG/ML
INJECTION, SOLUTION EPIDURAL; INFILTRATION; INTRACAUDAL; PERINEURAL AS NEEDED
Status: DISCONTINUED | OUTPATIENT
Start: 2022-01-25 | End: 2022-01-25 | Stop reason: SURG

## 2022-01-25 RX ORDER — HYDROCODONE BITARTRATE AND ACETAMINOPHEN 5; 325 MG/1; MG/1
2 TABLET ORAL AS NEEDED
Status: DISCONTINUED | OUTPATIENT
Start: 2022-01-25 | End: 2022-01-25

## 2022-01-25 RX ORDER — SODIUM CHLORIDE, SODIUM LACTATE, POTASSIUM CHLORIDE, CALCIUM CHLORIDE 600; 310; 30; 20 MG/100ML; MG/100ML; MG/100ML; MG/100ML
INJECTION, SOLUTION INTRAVENOUS CONTINUOUS
Status: DISCONTINUED | OUTPATIENT
Start: 2022-01-25 | End: 2022-01-25

## 2022-01-25 RX ORDER — EPHEDRINE SULFATE 50 MG/ML
INJECTION INTRAVENOUS AS NEEDED
Status: DISCONTINUED | OUTPATIENT
Start: 2022-01-25 | End: 2022-01-25 | Stop reason: SURG

## 2022-01-25 RX ORDER — ONDANSETRON 2 MG/ML
4 INJECTION INTRAMUSCULAR; INTRAVENOUS AS NEEDED
Status: DISCONTINUED | OUTPATIENT
Start: 2022-01-25 | End: 2022-01-25

## 2022-01-25 RX ORDER — CEFAZOLIN SODIUM/WATER 2 G/20 ML
2 SYRINGE (ML) INTRAVENOUS ONCE
Status: COMPLETED | OUTPATIENT
Start: 2022-01-25 | End: 2022-01-25

## 2022-01-25 RX ORDER — HYDROCODONE BITARTRATE AND ACETAMINOPHEN 5; 325 MG/1; MG/1
1 TABLET ORAL EVERY 4 HOURS PRN
Qty: 20 TABLET | Refills: 0 | Status: SHIPPED | OUTPATIENT
Start: 2022-01-25

## 2022-01-25 RX ORDER — LIDOCAINE HYDROCHLORIDE AND EPINEPHRINE 10; 10 MG/ML; UG/ML
INJECTION, SOLUTION INFILTRATION; PERINEURAL AS NEEDED
Status: DISCONTINUED | OUTPATIENT
Start: 2022-01-25 | End: 2022-01-25 | Stop reason: HOSPADM

## 2022-01-25 RX ORDER — HYDROMORPHONE HYDROCHLORIDE 1 MG/ML
0.4 INJECTION, SOLUTION INTRAMUSCULAR; INTRAVENOUS; SUBCUTANEOUS EVERY 5 MIN PRN
Status: DISCONTINUED | OUTPATIENT
Start: 2022-01-25 | End: 2022-01-25

## 2022-01-25 RX ORDER — HYDROCODONE BITARTRATE AND ACETAMINOPHEN 5; 325 MG/1; MG/1
1 TABLET ORAL AS NEEDED
Status: DISCONTINUED | OUTPATIENT
Start: 2022-01-25 | End: 2022-01-25

## 2022-01-25 RX ADMIN — EPHEDRINE SULFATE 10 MG: 50 INJECTION INTRAVENOUS at 12:30:00

## 2022-01-25 RX ADMIN — LIDOCAINE HYDROCHLORIDE 50 MG: 10 INJECTION, SOLUTION EPIDURAL; INFILTRATION; INTRACAUDAL; PERINEURAL at 12:21:00

## 2022-01-25 RX ADMIN — SODIUM CHLORIDE: 9 INJECTION, SOLUTION INTRAVENOUS at 12:55:00

## 2022-01-25 RX ADMIN — SODIUM CHLORIDE: 9 INJECTION, SOLUTION INTRAVENOUS at 11:55:00

## 2022-01-25 RX ADMIN — ONDANSETRON 4 MG: 2 INJECTION INTRAMUSCULAR; INTRAVENOUS at 12:21:00

## 2022-01-25 RX ADMIN — CEFAZOLIN SODIUM/WATER 2 G: 2 G/20 ML SYRINGE (ML) INTRAVENOUS at 12:23:00

## 2022-01-25 RX ADMIN — EPHEDRINE SULFATE 15 MG: 50 INJECTION INTRAVENOUS at 12:32:00

## 2022-01-25 RX ADMIN — DEXAMETHASONE SODIUM PHOSPHATE 4 MG: 4 MG/ML VIAL (ML) INJECTION at 12:21:00

## 2022-01-25 NOTE — ANESTHESIA POSTPROCEDURE EVALUATION
Yasmine 71 Patient Status:  Hospital Outpatient Surgery   Age/Gender 65year old male MRN [id number]   Location [address] [facility] Attending Joneen Castleman, MD   Hosp Day # 0 PCP MERVIN TIJERINA [facility]

## 2022-01-25 NOTE — INTERVAL H&P NOTE
Pre-op Diagnosis: Renal failure, unspecified chronicity [N19]  Patient and family have decided to proceed with pd  All questions were reviewed    The above referenced H&P was reviewed by Delfino Newsome MD on 1/25/2022, the patient was examined and no significant changes have occurred in the patient's condition since the H&P was performed. I discussed with the patient and/or legal representative the potential benefits, risks and side effects of this procedure; the likelihood of the patient achieving goals; and potential problems that might occur during recuperation. I discussed reasonable alternatives to the procedure, including risks, benefits and side effects related to the alternatives and risks related to not receiving this procedure. We will proceed with procedure as planned.

## 2022-01-25 NOTE — ANESTHESIA PREPROCEDURE EVALUATION
PRE-OP EVALUATION    Patient Name: Duane Bares    Admit Diagnosis: Renal failure, unspecified chronicity [N19]    Pre-op Diagnosis: Renal failure, unspecified chronicity [N19]    PLACEMENT OF PERITONEAL DIALYSIS CATHETER    Anesthesia Procedure: TAKE 1 TABLET(100 MG) BY MOUTH TWICE DAILY, Disp: 180 tablet, Rfl: 3, 1/25/2022 at 0730  finasteride 5 MG Oral Tab, Take 5 mg by mouth daily. , Disp: , Rfl:   hydrALAzine HCl 100 MG Oral Tab, Take 1 tablet (100 mg total) by mouth 3 (three) times daily. , Dis Neuro/Psych                 (+) neuromuscular disease             Hx lymphoma  Hx prostate ca      Past Surgical History:   Procedure Laterality Date   • CATARACT Bilateral 2007, 2011    R eye then L.  Dr. Henry Rodriguez   • [facility] bilaterally. Other findings            ASA: 3   Plan: general  NPO status verified and patient meets guidelines.           Plan/risks discussed with: patient                Present on Admission:  **None**

## 2022-01-25 NOTE — BRIEF OP NOTE
Pre-Operative Diagnosis: Renal failure, unspecified chronicity [N19]     Post-Operative Diagnosis: Renal failure, unspecified chronicity [N19]      Procedure Performed:   PLACEMENT OF PERITONEAL DIALYSIS CATHETER    Surgeon(s) and Role:     * Lucia Huff MD - Primary    Assistant(s):  PA: Opal Kwok          Estimated Blood Loss: Blood Output: 5 mL (1/25/2022 12:56 PM)        Neo Hoang MD  1/25/2022  1:18 PM

## 2022-01-25 NOTE — ANESTHESIA PROCEDURE NOTES
Airway  Date/Time: 1/25/2022 12:22 PM  Urgency: Elective    Airway not difficult    General Information and Staff    Patient location during procedure: OR  Anesthesiologist: Bacilio Loaj MD  Resident/CRNA: Francine Bhat CRNA  Performed: CRNA

## 2022-02-01 NOTE — OPERATIVE REPORT
[facility]    PATIENT'S NAME: Loraine LANCE   ATTENDING PHYSICIAN: Enid Conner M.D. OPERATING PHYSICIAN: Enid Conner M.D. PATIENT ACCOUNT#:   [de-identified]    LOCATION:  [address]  MEDICAL RECORD #:   [id number]       YOB: 1945  ADMISSION DATE:       01/25/2022      OPERATION DATE:  01/25/2022    OPERATIVE REPORT    PREOPERATIVE DIAGNOSIS:  Renal failure. POSTOPERATIVE DIAGNOSIS:  Renal failure. PROCEDURE:  Placement of a peritoneal dialysis catheter. ASSISTANT:  Dakota Oneill PA-C. Her assistance was necessary for retracting and suturing. ANESTHESIA:  General.    OPERATIVE TECHNIQUE:  Patient was brought into the operating room, placed on the operating table in supine position. Inhalational anesthesia provided by the attending anesthesiologist.  The abdomen was prepped in the usual sterile fashion. Then, 1% lidocaine and 0.5% Marcaine were used as a local anesthetic. I made a skin incision transverse across the left rectus sheath just above the umbilicus. I exposed the rectus fascia, opened the fascia transversely, split the rectus muscle in the middle to expose the posterior fascia. I made a small opening in the posterior fascia. I used a 2-0 Prolene pursestring and then placed the swan-neck catheter through this opening into the pelvis. The round ball was beneath the fascia, the fabric disc was just above the fascia, and I tied the pursestring around the catheter. I then placed a suture through the fabric ring into posterior rectus sheath to prevent rotation. I then tunneled the catheter through the rectus sheath inferior and lateral.  Hemostasis was good. I did a practice dwell and the catheter worked well. I closed the anterior fascia with a running 0 Vicryl suture. Hemostasis was good. The wound was closed in layers using absorbable sutures. Steri-Strips, sterile dressing were provided.   Patient tolerated the procedure well.  He was taken to recovery area for observation.     Dictated By Autumn Drake M.D.  d: 01/31/2022 11:56:18  t: 01/31/2022 22:42:55  Selinda Hamman [id number]  [facility]/

## 2022-03-20 PROBLEM — S32.2XXA CLOSED FRACTURE OF SACRUM AND COCCYX, INITIAL ENCOUNTER (HCC): Status: ACTIVE | Noted: 2022-03-20

## 2022-03-20 PROBLEM — S32.10XA CLOSED FRACTURE OF SACRUM AND COCCYX, INITIAL ENCOUNTER (HCC): Status: ACTIVE | Noted: 2022-03-20

## 2022-05-16 ENCOUNTER — APPOINTMENT (OUTPATIENT)
Dept: CT IMAGING | Facility: HOSPITAL | Age: 77
End: 2022-05-16
Attending: EMERGENCY MEDICINE
Payer: MEDICARE

## 2022-05-16 ENCOUNTER — HOSPITAL ENCOUNTER (EMERGENCY)
Facility: HOSPITAL | Age: 77
Discharge: ANOTHER HOSPITAL | End: 2022-05-16
Attending: EMERGENCY MEDICINE
Payer: MEDICARE

## 2022-05-16 ENCOUNTER — APPOINTMENT (OUTPATIENT)
Dept: GENERAL RADIOLOGY | Facility: HOSPITAL | Age: 77
End: 2022-05-16
Attending: EMERGENCY MEDICINE
Payer: MEDICARE

## 2022-05-16 VITALS
DIASTOLIC BLOOD PRESSURE: 44 MMHG | SYSTOLIC BLOOD PRESSURE: 107 MMHG | HEART RATE: 82 BPM | WEIGHT: 125.69 LBS | BODY MASS INDEX: 18 KG/M2 | RESPIRATION RATE: 17 BRPM | OXYGEN SATURATION: 100 %

## 2022-05-16 DIAGNOSIS — I10 MALIGNANT HYPERTENSION: ICD-10-CM

## 2022-05-16 DIAGNOSIS — S09.90XA TRAUMATIC INJURY OF HEAD, INITIAL ENCOUNTER: ICD-10-CM

## 2022-05-16 DIAGNOSIS — S06.5X9A BILATERAL SUBDURAL HEMATOMAS (HCC): Primary | ICD-10-CM

## 2022-05-16 DIAGNOSIS — D64.9 CHRONIC ANEMIA: ICD-10-CM
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
## 2022-05-16 PROBLEM — S06.5XAA SUBDURAL HEMATOMA (CMD): Status: ACTIVE | Noted: 2022-01-01

## 2022-05-16 PROBLEM — I62.00 SUBDURAL BLEEDING (CMD): Status: ACTIVE | Noted: 2022-01-01

## 2022-05-16 LAB
ACETONE: NEGATIVE
ADENOVIRUS PCR:: NOT DETECTED
ALBUMIN SERPL-MCNC: 2.5 G/DL (ref 3.4–5)
ALBUMIN/GLOB SERPL: 0.4 {RATIO} (ref 1–2)
ALP LIVER SERPL-CCNC: 82 U/L
ALT SERPL-CCNC: 36 U/L
AMPHET UR QL SCN: NEGATIVE
ANION GAP SERPL CALC-SCNC: 15 MMOL/L (ref 0–18)
ANTIBODY SCREEN: NEGATIVE
APTT PPP: 26.9 SECONDS (ref 23.3–35.6)
ARTERIAL PATENCY WRIST A: POSITIVE
AST SERPL-CCNC: 22 U/L (ref 15–37)
B PARAPERT DNA SPEC QL NAA+PROBE: NOT DETECTED
B PERT DNA SPEC QL NAA+PROBE: NOT DETECTED
BASE EXCESS BLDA CALC-SCNC: -5 MMOL/L (ref ?–2)
BASOPHILS # BLD AUTO: 0.04 X10(3) UL (ref 0–0.2)
BASOPHILS NFR BLD AUTO: 0.3 %
BENZODIAZ UR QL SCN: NEGATIVE
BILIRUB SERPL-MCNC: 0.4 MG/DL (ref 0.1–2)
BILIRUB UR QL STRIP.AUTO: NEGATIVE
BODY TEMPERATURE: 98.6 F
BUN BLD-MCNC: 108 MG/DL (ref 7–18)
C PNEUM DNA SPEC QL NAA+PROBE: NOT DETECTED
CA-I BLD-SCNC: 1.15 MMOL/L (ref 0.95–1.32)
CALCIUM BLD-MCNC: 9.2 MG/DL (ref 8.5–10.1)
CANNABINOIDS UR QL SCN: NEGATIVE
CHLORIDE SERPL-SCNC: 102 MMOL/L (ref 98–112)
CO2 SERPL-SCNC: 19 MMOL/L (ref 21–32)
COCAINE UR QL: NEGATIVE
COHGB MFR BLD: 1.7 % SAT (ref 0–3)
COLOR UR AUTO: YELLOW
CORONAVIRUS 229E PCR:: NOT DETECTED
CORONAVIRUS HKU1 PCR:: NOT DETECTED
CORONAVIRUS NL63 PCR:: NOT DETECTED
CORONAVIRUS OC43 PCR:: NOT DETECTED
CREAT BLD-MCNC: 7.06 MG/DL
CREAT UR-SCNC: 65.2 MG/DL
EOSINOPHIL # BLD AUTO: 0.45 X10(3) UL (ref 0–0.7)
EOSINOPHIL NFR BLD AUTO: 2.8 %
ERYTHROCYTE [DISTWIDTH] IN BLOOD BY AUTOMATED COUNT: 17.3 %
ETHANOL SERPL-MCNC: <3 MG/DL (ref ?–3)
FIO2: 100 %
FLUAV RNA SPEC QL NAA+PROBE: NOT DETECTED
FLUBV RNA SPEC QL NAA+PROBE: NOT DETECTED
GLOBULIN PLAS-MCNC: 6 G/DL (ref 2.8–4.4)
GLUCOSE BLD-MCNC: 298 MG/DL (ref 70–99)
GLUCOSE UR STRIP.AUTO-MCNC: 50 MG/DL
HCO3 BLDA-SCNC: 21 MEQ/L (ref 21–27)
HCT VFR BLD AUTO: 26.9 %
HGB BLD-MCNC: 7.9 G/DL
HGB BLD-MCNC: 8.6 G/DL
IMM GRANULOCYTES # BLD AUTO: 0.1 X10(3) UL (ref 0–1)
IMM GRANULOCYTES NFR BLD: 0.6 %
INR BLD: 1.1 (ref 0.8–1.2)
KETONES UR STRIP.AUTO-MCNC: NEGATIVE MG/DL
LACTATE BLD-SCNC: 2.6 MMOL/L (ref 0.5–2)
LACTATE SERPL-SCNC: 3 MMOL/L (ref 0.4–2)
LEUKOCYTE ESTERASE UR QL STRIP.AUTO: NEGATIVE
LIPASE SERPL-CCNC: 331 U/L (ref 73–393)
LYMPHOCYTES # BLD AUTO: 7.4 X10(3) UL (ref 1–4)
LYMPHOCYTES NFR BLD AUTO: 46.5 %
MCH RBC QN AUTO: 31.5 PG (ref 26–34)
MCHC RBC AUTO-ENTMCNC: 32 G/DL (ref 31–37)
MCV RBC AUTO: 98.5 FL
MDMA UR QL SCN: NEGATIVE
METAPNEUMOVIRUS PCR:: NOT DETECTED
METHGB MFR BLD: 2.8 % SAT (ref 0.4–1.5)
MONOCYTES # BLD AUTO: 0.76 X10(3) UL (ref 0.1–1)
MONOCYTES NFR BLD AUTO: 4.8 %
MYCOPLASMA PNEUMONIA PCR:: NOT DETECTED
NEUTROPHILS # BLD AUTO: 7.16 X10 (3) UL (ref 1.5–7.7)
NEUTROPHILS # BLD AUTO: 7.16 X10(3) UL (ref 1.5–7.7)
NEUTROPHILS NFR BLD AUTO: 45 %
NITRITE UR QL STRIP.AUTO: NEGATIVE
NT-PROBNP SERPL-MCNC: 7446 PG/ML (ref ?–450)
OPIATES UR QL SCN: NEGATIVE
OSMOLALITY SERPL CALC.SUM OF ELEC: 327 MOSM/KG (ref 275–295)
OXYCODONE UR QL SCN: NEGATIVE
OXYHGB MFR BLDA: 95.6 % (ref 92–100)
PARAINFLUENZA 1 PCR:: NOT DETECTED
PARAINFLUENZA 2 PCR:: NOT DETECTED
PARAINFLUENZA 3 PCR:: NOT DETECTED
PARAINFLUENZA 4 PCR:: NOT DETECTED
PCO2 BLDA: 32 MM HG (ref 35–45)
PEEP: 5 CM H2O
PH BLDA: 7.39 [PH] (ref 7.35–7.45)
PH UR STRIP.AUTO: 5 [PH] (ref 5–8)
PLATELET # BLD AUTO: 287 10(3)UL (ref 150–450)
PO2 BLDA: 504 MM HG (ref 80–100)
POTASSIUM BLD-SCNC: 3.8 MMOL/L (ref 3.6–5.1)
POTASSIUM SERPL-SCNC: 3.6 MMOL/L (ref 3.5–5.1)
PROCALCITONIN SERPL-MCNC: 0.14 NG/ML (ref ?–0.16)
PROT SERPL-MCNC: 8.5 G/DL (ref 6.4–8.2)
PROT UR STRIP.AUTO-MCNC: 100 MG/DL
PROTHROMBIN TIME: 14.2 SECONDS (ref 11.6–14.8)
RBC # BLD AUTO: 2.73 X10(6)UL
RH BLOOD TYPE: POSITIVE
RHINOVIRUS/ENTERO PCR:: NOT DETECTED
RSV RNA SPEC QL NAA+PROBE: NOT DETECTED
SARS-COV-2 RNA NPH QL NAA+NON-PROBE: NOT DETECTED
SARS-COV-2 RNA RESP QL NAA+PROBE: NOT DETECTED
SODIUM BLD-SCNC: 137 MMOL/L (ref 135–145)
SODIUM SERPL-SCNC: 136 MMOL/L (ref 136–145)
SP GR UR STRIP.AUTO: 1.01 (ref 1–1.03)
TIDAL VOLUME: 400 ML
TROPONIN I HIGH SENSITIVITY: 13 NG/L
TROPONIN I HIGH SENSITIVITY: 20 NG/L
UROBILINOGEN UR STRIP.AUTO-MCNC: <2 MG/DL
VENT RATE: 16 /MIN
WBC # BLD AUTO: 15.9 X10(3) UL (ref 4–11)

## 2022-05-16 PROCEDURE — 70450 CT HEAD/BRAIN W/O DYE: CPT | Performed by: EMERGENCY MEDICINE

## 2022-05-16 PROCEDURE — 99223 1ST HOSP IP/OBS HIGH 75: CPT | Performed by: SURGERY

## 2022-05-16 PROCEDURE — 71045 X-RAY EXAM CHEST 1 VIEW: CPT | Performed by: EMERGENCY MEDICINE

## 2022-05-16 PROCEDURE — 99285 EMERGENCY DEPT VISIT HI MDM: CPT | Performed by: HOSPITALIST

## 2022-05-16 PROCEDURE — 72125 CT NECK SPINE W/O DYE: CPT | Performed by: EMERGENCY MEDICINE

## 2022-05-16 RX ORDER — LABETALOL HYDROCHLORIDE 5 MG/ML
INJECTION, SOLUTION INTRAVENOUS
Status: COMPLETED | OUTPATIENT
Start: 2022-05-16 | End: 2022-05-16

## 2022-05-16 RX ORDER — LABETALOL HYDROCHLORIDE 5 MG/ML
INJECTION, SOLUTION INTRAVENOUS
Status: DISCONTINUED
Start: 2022-05-16 | End: 2022-05-16

## 2022-05-16 NOTE — PROGRESS NOTES
05/16/22 1513   Clinical Encounter Type   Visited With Family; Health care provider  (Wife and son)   Crisis Visit ED   Patient's Supportive Strategies/Resources Family appeared very supportive. Wife silently praying. Taxonomy   Intended Effects Convey a calming presence   Methods Accompany someone in their spiritual/Mormon practice outside your stanley tradition; Offer support;Collaborate with care team member   Interventions Acknowledge current situation; Acknowledge response to difficult experience; Active listening; Ask guided questions about stanley;Discuss concerns; Facilitate communication between patient and/or family member and care team   Spiritual care remains available via Epic consult or at pager [phone].

## 2022-05-16 NOTE — ED INITIAL ASSESSMENT (HPI)
Patient to ED via EMS. Reports the patient had a witnessed fall outside, wife dragged him into the house 20-30 minutes later called 911.  + vomit prior to meds per EMS.  + YVONNE per EMS. Ketamin and versed given enroute.

## 2022-05-18 LAB
ATRIAL RATE: 93 BPM
P AXIS: 77 DEGREES
P-R INTERVAL: 198 MS
Q-T INTERVAL: 418 MS
QRS DURATION: 132 MS
QTC CALCULATION (BEZET): 519 MS
R AXIS: -77 DEGREES
T AXIS: 70 DEGREES
VENTRICULAR RATE: 93 BPM

## 2023-07-12 NOTE — PROGRESS NOTES
?  RHEUMATOLOGY NEW PATIENT   Date of visit: 2/15/2019  ? Patient presents with:  Establish Care: NP ref by PCP for elevated ESR.  2/5/19 labs . Pt states 'went to see PCP because of muscle weakness.  Was given prednisone, is currently on 20 mg da Recommended compression socks and elevation of feet for the swelling. Will also recheck his Immunoglobulin levels. Will have pt recheck his inflammatory markers after being on steroids for one week. And we will talk over the phone regarding this.  Discussed PROTEIN  -     SED RATE, WESTERGREN (AUTOMATED)  -     methylPREDNISolone acetate (DEPO-medrol) 40 MG/ML injection 40 mg    History of non-Hodgkin's lymphoma  -     IMMUNOGLOBULIN A/G/M, QUANT;  Future  -     C-REACTIVE PROTEIN  -     SED RATE, WESTERGREN ( September and thinks gout was mentioned but was not treated. Hx of left knee pain and swelling in past, was given topical. Took about 5 weeks to resolve on it's own. Morning stiffness lasting about 5 minutes and able to function after that.  Gets occas Coronary atherosclerosis of unspecified type of vessel, native or graft     sees every 2 years, Dr. Jose Dave, will schedule 2016   • Diabetic eye exam ([facility] [address] 9/20/17, 12/11, 1/13, 3/14, 3/17/15, 8/31/16, 3/8/17    Dr. Amanda Barnes   • ED (erectile dysfunction)    • E Neg      Social History:  Social History    Tobacco Use      Smoking status: Former Smoker        Packs/day: 1.00        Years: 37.00        Pack years: 37        Types: Cigarettes        Quit date: 2005        Years since quittin.1      Smokeless per day Disp: 30 capsule Rfl: 0   LOW-DOSE ASPIRIN 81 MG OR TABS 1 TABLET DAILY Disp:  Rfl:      Modified Medications    No medications on file     There are no discontinued medications. ?  ?  Allergies:  No Known Allergies  ? REVIEW OF SYSTEMS   ?   Revi rhythm and intact distal pulses. Murmur heard. Bilateral pitting edema from dorsum of feet to distal knee   Pulmonary/Chest: Effort normal and breath sounds normal. No respiratory distress. He has no wheezes. Abdominal: Soft.  Bowel sounds are normal. None  TECHNIQUE: AP, lateral and oblique views of the right foot  FINDINGS: There is no acute fracture or dislocation. There is mild bony demineralization. There are  mild degenerative changes at the first MTP joint.  There are spurs along the plantar surfa mesenteric lymphadenopathy. Unremarkable appendix. Moderate feces in the colon. No large or small bowel dilatation. No free air or fluid. ABDOMINAL WALL:  Small fat containing left inguinal hernia.  Changes likely related to subcutaneous injections along t ANIONGAP 11 01/15/2019    GFR 33 (L) 09/22/2016    GFRNAA 29 (L) 01/15/2019    GFRAA 34 (L) 01/15/2019    CA 9.3 02/05/2019    OSMOCALC 295 01/15/2019    ALKPHO 101 01/17/2019    AST 31 01/17/2019    ALT 44 01/17/2019    BILT 0.39 01/17/2019    TP 8.7 (H) EKG is interpreted by me heart rate 81 AZ interval 166 QRS 90 QTc 434 no ST changes no T wave inversions normal axis

## (undated) DEVICE — BATTERY SURG DRIVER MATRIXPRO STRL DISP

## (undated) DEVICE — GOWN SURG LG L3 NONREINFORCE SET IN SLV STRL LF DISP BLUE

## (undated) DEVICE — GLOVE SURG 8 PROTEXIS LF BLUE PF SMTH BEAD CUFF INTLK STRL

## (undated) DEVICE — SPONGE ABS THK10MM 12.5X8CM PORCINE GELTN STRL DISP SURGFM

## (undated) DEVICE — DRAPE 2 INCS FILM ANTIMICROBIAL 23X17IN SURG IOBAN STRL

## (undated) DEVICE — GOWN SURG 2XL L3 NONREINFORCE SET IN SLV STRL LF DISP BLUE

## (undated) DEVICE — 450 ML BOTTLE OF 0.05% CHLORHEXIDINE GLUCONATE IN 99.95% STERILE WATER FOR IRRIGATION, USP AND APPLICATOR.: Brand: IRRISEPT ANTIMICROBIAL WOUND LAVAGE

## (undated) DEVICE — VIOLET BRAIDED (POLYGLACTIN 910), SYNTHETIC ABSORBABLE SUTURE: Brand: COATED VICRYL

## (undated) DEVICE — GLOVE SURG 7.5 PROTEXIS LF BLUE PF SMTH BEAD CUFF INTLK STRL

## (undated) DEVICE — AGENT HMST AVTN MCFIB CLGN 1GM FLOUR

## (undated) DEVICE — SYRINGE 50ML GRAD N-PYRG DEHP-FR PVC FREE STRL CATHTP LF

## (undated) DEVICE — SYRINGE 60CC TIP PRTC SM TUBE ADPR IRR DISC STRL BULB LF

## (undated) DEVICE — CLAVE MALE ADAPTER PLUG

## (undated) DEVICE — SYRINGE 60CC PRTC CAP STRL BULB DOVER

## (undated) DEVICE — SPONGE SURG 1X.5IN CTTND ABS RADOPQ PRCS CUT PATTIE STRL LF

## (undated) DEVICE — CLIP XTRN SCALP 10 CLIP 20 CRTDG STRL DISP

## (undated) DEVICE — TUBING SCT 2FT 5MM FRZR MALE CNCT CLAMP STRL DISP

## (undated) DEVICE — HOOK RTRCT .5IN DERMAHOOK FISH ELASTIC STRL LF DISP

## (undated) DEVICE — GLOVE SURG 6.5 PREMIERPRO LF NATURAL PF TXTR SMTH STRL

## (undated) DEVICE — SCD SLEEVE KNEE HI BLEND

## (undated) DEVICE — SUTURE PROLENE 2-0 SH

## (undated) DEVICE — UNDYED BRAIDED (POLYGLACTIN 910), SYNTHETIC ABSORBABLE SUTURE: Brand: COATED VICRYL

## (undated) DEVICE — PERFORATOR 14MM 11MM CODMAN HUDSON END SHARP CLR CD ATRM TIP

## (undated) DEVICE — HEMOSTAT ABS 8X4IN FLXB SHEER WEAVE SURGICEL STRL DISP

## (undated) DEVICE — GAUZE SPONGES,USP TYPE VII GAUZE, 12 PLY: Brand: CURITY

## (undated) DEVICE — GLOVE SURG 7.5 PROTEXIS LF CRM PF SMTH BEAD CUFF STRL

## (undated) DEVICE — CATH MISSOURI COIL LEFT: Type: IMPLANTABLE DEVICE | Site: ABDOMEN

## (undated) DEVICE — SUTURE VICRYL 3-0 SH 18IN CNTRL RELS BRAID 8 STRN COAT ABS J864D

## (undated) DEVICE — DRESSING TRANS 4.75X4IN ADH HPOAL WTPRF TEGADERM PU STD STRL

## (undated) DEVICE — Device

## (undated) DEVICE — SUTURE VICRYL 2-0 CP-2 18IN CNTRL RELS BRAID 8 STRN COAT ABS J762D

## (undated) DEVICE — 3M™ TEGADERM™ TRANSPARENT FILM DRESSING, 1626W, 4 IN X 4-3/4 IN (10 CM X 12 CM), 50 EACH/CARTON, 4 CARTON/CASE: Brand: 3M™ TEGADERM™

## (undated) DEVICE — SOL  .9 1000ML BTL

## (undated) DEVICE — GLOVE SURG 6.5 PROTEXIS LF BLUE PF SMTH BEAD CUFF INTLK STRL

## (undated) DEVICE — GLOVE SURG 8 PROTEXIS LF CRM PF BEAD CUFF STRL PLISPRN 12IN

## (undated) DEVICE — SUTURE NUROLON 4-0 TF TAPERPOINT 18IN CNTRL RELS BRAID 8 C584D

## (undated) DEVICE — DRESSING BIOPATCH 1X4 CNTR

## (undated) DEVICE — STERILE SYNTHETIC POLYISOPRENE POWDER-FREE SURGICAL GLOVES WITH HYDROGEL COATING, SMOOTH FINISH, STRAIGHT FINGER: Brand: PROTEXIS

## (undated) DEVICE — HANDLE SCT MDVC FRZR 12FR CNTRL VENT STRL LF DISP

## (undated) DEVICE — TOOL 8CM MIDAS REX LGND 2.3MM SPRL TPR FT ATCH DSCT 15.9MM

## (undated) DEVICE — MINI LAP PACK-LF: Brand: MEDLINE INDUSTRIES, INC.

## (undated) DEVICE — REM POLYHESIVE ADULT PATIENT RETURN ELECTRODE: Brand: VALLEYLAB

## (undated) DEVICE — THIS SET CONSISTS OF A FEMALE LOCKING CONNECTOR, ON-OFF CLAMP, TUBING AND DOUBLE SEALING MALE LUER LOCK CONNECTOR. THIS SET IS TO BE USED WITH THE BAXTER LOCKING TITANIUM ADAPTER FOR PERITONEAL DIALYSIS CATHETER IN DISCONNECT APPLICATIONS AND IN CYCLER APPLICATIONS WHERE ASEPTIC CONNECTIONS AND DISCONNECTIONS ARE PERFORMED AT THE TRANSFER SET/CYCLER SET JUNCTURE.: Brand: MINICAP

## (undated) DEVICE — SUTURE VICRYL 3-0 SH

## (undated) DEVICE — SOL  .9 1000ML BAG

## (undated) DEVICE — THIS ADAPTER IS A DOUBLE SEALING FEMALE LUER LOCK ADAPTER WITH A 2-PIECE, COMBINATION COMPRESSION FIT/BARBED CATHETER CONNECTOR. THE ADAPTER IS USED TO CONNECT THE PD CATHETER TO A SOLUTION TRANSFER SET WITH LOCKING CONNECTOR.: Brand: LOCKING TITANIUM ADAPTER FOR PERITONEAL DIALYSIS CATHETER

## (undated) DEVICE — SOLUTION SET, MALE LUER LOCK ADAPTER

## (undated) DEVICE — THIS DEVICE IS A PLASTIC DISCONNECT CAP FOR PERITONEAL DIALYSIS AND CONTAINS POVIDONE-IODINE INTENDED TO PROTECT THE FEMALE LUER CONNECTOR OF THE BAXTER TRANSFER SET.: Brand: MINICAP WITH POVIDONE-IODINE SOLUTION

## (undated) NOTE — LETTER
[address] [facility]  Phone: [phone]  Right Fax: [phone]  [facility] [address] By:  Mary Thurman RN Date: 22    Patient Name: Roslyn Zaragoza  Surgery Date: 2022    CSN: [id number]  Medical Record: [id number]   : 1945 - A: 65 y      Sex: male    Surgeon(s):  Solange Louis MD    Procedure: PLACEMENT OF PERITONEAL DIALYSIS CATHETER, N/A    Procedure Comments: PLACEMENT OF PERITONEAL DIALYSIS CATHETER  Anesthesia Type: General    To Attending: Solange Louis MD Fax #: [phone]  To Cardiologist:  Dr. Lily Acosta MD   Fax #: [phone]    [facility]  1  PAGES (Pernell Rosenberg)    ANESTHESIOLOGIST Dr. Eric Morelos REQUESTED THE FOLLOWING:  NOTE OF CARDIAC CLEARANCE  _______________________________________________________________  Please note the following attached testing results for your review:   Due to history of stenosis    [address], [address] [facility]  Phone: [phone]  Fax: [phone]  [email or web address]    STATEMENT OF CONFIDENTIALITY: This transmittal is intended only for the use of the individual entity to which is addressed and may contain information that is privileged and confidential. information contained. If the reader of this message IS NOT the intended recipient, you are hereby notified that any disclosure, distribution or copying of this information is strictly prohibited. If you have received this transmission in error, please notify us immediately by telephone and return the original documents to us at the above address via the [facility]. Thank you.

## (undated) NOTE — LETTER
[facility] [address], [address]  Consent for Procedure/Sedation    Date: 12/3/21    Time: 1200      1. I authorize the performance upon John S Jaswinder Escamilla the following:  Permanent Dialysis Catheter Insertion     2.  I Kaylie Layne Pretty Martinez        : 1945       Medical Record #: [id number]

## (undated) NOTE — Clinical Note
Hi, Dr. Giancalro García. Thank you for referring Mr. Mercedes Munoz for rheumatologic evaluation. Please see the discussion portion of my note and let me know if you have any questions. I am very worried about this pt.  I have increased his prednisone to

## (undated) NOTE — LETTER
September 18, 2018          [facility]  [place] [address]          Dear Jada Saba:    a1c far above goal, increase lantus from 70 to 80 units daily . Increase mealtime from 15 to 18 units daily.    Recheck bmp and a1c Result Value Ref Range    Magnesium 2.0 1.8 - 2.5 mg/dL   URIC ACID, SERUM   Result Value Ref Range    Uric Acid 9.0 (H) 2.4 - 8.7 mg/dL   URINALYSIS WITH CULTURE REFLEX   Result Value Ref Range    Urine Color Yellow Yellow    Clarity Urine Clear Clear Neutrophil Absolute 5.15 1.30 - 6.70 x10(3) uL    Lymphocyte Absolute 4.17 (H) 0.90 - 4.00 x10(3) uL    Monocyte Absolute 0.47 0.10 - 1.00 x10(3) uL    Eosinophil Absolute 0.27 0.00 - 0.30 x10(3) uL    Basophil Absolute 0.03 0.00 - 0.10 x10(3) uL    Immat